# Patient Record
Sex: FEMALE | Race: WHITE | Employment: OTHER | URBAN - NONMETROPOLITAN AREA
[De-identification: names, ages, dates, MRNs, and addresses within clinical notes are randomized per-mention and may not be internally consistent; named-entity substitution may affect disease eponyms.]

---

## 2018-06-25 ENCOUNTER — TRANSFERRED RECORDS (OUTPATIENT)
Dept: HEALTH INFORMATION MANAGEMENT | Facility: OTHER | Age: 83
End: 2018-06-25

## 2018-12-18 ENCOUNTER — OFFICE VISIT (OUTPATIENT)
Dept: FAMILY MEDICINE | Facility: OTHER | Age: 83
End: 2018-12-18
Attending: NURSE PRACTITIONER
Payer: MEDICARE

## 2018-12-18 VITALS — HEART RATE: 74 BPM | WEIGHT: 180.38 LBS | SYSTOLIC BLOOD PRESSURE: 130 MMHG | DIASTOLIC BLOOD PRESSURE: 78 MMHG

## 2018-12-18 DIAGNOSIS — E78.2 MIXED HYPERLIPIDEMIA: ICD-10-CM

## 2018-12-18 DIAGNOSIS — G45.9 TIA (TRANSIENT ISCHEMIC ATTACK): ICD-10-CM

## 2018-12-18 DIAGNOSIS — I10 BENIGN ESSENTIAL HYPERTENSION: ICD-10-CM

## 2018-12-18 DIAGNOSIS — L29.9 PRURITIC DISORDER: ICD-10-CM

## 2018-12-18 DIAGNOSIS — M79.7 FIBROMYALGIA: ICD-10-CM

## 2018-12-18 DIAGNOSIS — G30.1 LATE ONSET ALZHEIMER'S DISEASE WITHOUT BEHAVIORAL DISTURBANCE (H): ICD-10-CM

## 2018-12-18 DIAGNOSIS — E11.8 TYPE 2 DIABETES MELLITUS WITH COMPLICATION, WITHOUT LONG-TERM CURRENT USE OF INSULIN (H): Primary | ICD-10-CM

## 2018-12-18 DIAGNOSIS — F03.90 DEMENTIA WITHOUT BEHAVIORAL DISTURBANCE, UNSPECIFIED DEMENTIA TYPE: ICD-10-CM

## 2018-12-18 DIAGNOSIS — F02.80 LATE ONSET ALZHEIMER'S DISEASE WITHOUT BEHAVIORAL DISTURBANCE (H): ICD-10-CM

## 2018-12-18 DIAGNOSIS — G47.33 OBSTRUCTIVE SLEEP APNEA SYNDROME: ICD-10-CM

## 2018-12-18 LAB
ALBUMIN SERPL-MCNC: 3.7 G/DL (ref 3.5–5.7)
ALP SERPL-CCNC: 96 U/L (ref 34–104)
ALT SERPL W P-5'-P-CCNC: 18 U/L (ref 7–52)
ANION GAP SERPL CALCULATED.3IONS-SCNC: 8 MMOL/L (ref 3–14)
AST SERPL W P-5'-P-CCNC: 23 U/L (ref 13–39)
BILIRUB SERPL-MCNC: 0.6 MG/DL (ref 0.3–1)
BUN SERPL-MCNC: 16 MG/DL (ref 7–25)
CALCIUM SERPL-MCNC: 9.6 MG/DL (ref 8.6–10.3)
CHLORIDE SERPL-SCNC: 100 MMOL/L (ref 98–107)
CO2 SERPL-SCNC: 28 MMOL/L (ref 21–31)
CREAT SERPL-MCNC: 0.73 MG/DL (ref 0.6–1.2)
ERYTHROCYTE [DISTWIDTH] IN BLOOD BY AUTOMATED COUNT: 13.9 % (ref 10–15)
FERRITIN SERPL-MCNC: 248 NG/ML (ref 23.9–336.2)
GFR SERPL CREATININE-BSD FRML MDRD: 76 ML/MIN/1.7M2
GLUCOSE SERPL-MCNC: 214 MG/DL (ref 70–105)
HBA1C MFR BLD: 8.9 % (ref 4–6)
HCT VFR BLD AUTO: 51.6 % (ref 35–47)
HGB BLD-MCNC: 17.5 G/DL (ref 11.7–15.7)
MCH RBC QN AUTO: 32.9 PG (ref 26.5–33)
MCHC RBC AUTO-ENTMCNC: 33.9 G/DL (ref 31.5–36.5)
MCV RBC AUTO: 97 FL (ref 78–100)
PLATELET # BLD AUTO: 162 10E9/L (ref 150–450)
POTASSIUM SERPL-SCNC: 4 MMOL/L (ref 3.5–5.1)
PROT SERPL-MCNC: 7.6 G/DL (ref 6.4–8.9)
RBC # BLD AUTO: 5.32 10E12/L (ref 3.8–5.2)
SODIUM SERPL-SCNC: 136 MMOL/L (ref 134–144)
WBC # BLD AUTO: 9 10E9/L (ref 4–11)

## 2018-12-18 PROCEDURE — 82728 ASSAY OF FERRITIN: CPT | Performed by: NURSE PRACTITIONER

## 2018-12-18 PROCEDURE — G0463 HOSPITAL OUTPT CLINIC VISIT: HCPCS

## 2018-12-18 PROCEDURE — 99203 OFFICE O/P NEW LOW 30 MIN: CPT | Performed by: NURSE PRACTITIONER

## 2018-12-18 PROCEDURE — 36415 COLL VENOUS BLD VENIPUNCTURE: CPT | Performed by: NURSE PRACTITIONER

## 2018-12-18 PROCEDURE — 83036 HEMOGLOBIN GLYCOSYLATED A1C: CPT | Performed by: NURSE PRACTITIONER

## 2018-12-18 PROCEDURE — 80053 COMPREHEN METABOLIC PANEL: CPT | Performed by: NURSE PRACTITIONER

## 2018-12-18 PROCEDURE — 85027 COMPLETE CBC AUTOMATED: CPT | Performed by: NURSE PRACTITIONER

## 2018-12-18 RX ORDER — DILTIAZEM HCL 60 MG
60 TABLET ORAL 4 TIMES DAILY
Qty: 360 TABLET | Refills: 3 | COMMUNITY
Start: 2018-12-18

## 2018-12-18 RX ORDER — HYDROXYZINE HYDROCHLORIDE 25 MG/1
25-50 TABLET, FILM COATED ORAL EVERY 6 HOURS PRN
Qty: 60 TABLET | Refills: 1 | Status: ON HOLD | OUTPATIENT
Start: 2018-12-18 | End: 2019-01-01

## 2018-12-18 RX ORDER — REPAGLINIDE 1 MG/1
1 TABLET ORAL
Qty: 270 TABLET | Refills: 3 | Status: ON HOLD | COMMUNITY
Start: 2018-12-18 | End: 2019-01-01

## 2018-12-18 RX ORDER — HYDROCHLOROTHIAZIDE 25 MG/1
25 TABLET ORAL DAILY
Qty: 90 TABLET | Refills: 3 | COMMUNITY
Start: 2018-12-18

## 2018-12-18 RX ORDER — CITALOPRAM HYDROBROMIDE 40 MG/1
40 TABLET ORAL DAILY
Qty: 90 TABLET | Refills: 3 | COMMUNITY
Start: 2018-12-18

## 2018-12-18 SDOH — HEALTH STABILITY: MENTAL HEALTH: HOW OFTEN DO YOU HAVE A DRINK CONTAINING ALCOHOL?: NEVER

## 2018-12-18 ASSESSMENT — PAIN SCALES - GENERAL: PAINLEVEL: NO PAIN (0)

## 2018-12-18 NOTE — PATIENT INSTRUCTIONS
Hydroxyzine 3 times daily as needed for itching  Good cream to skin 2-3 times daily  Will call with labs and orders when done

## 2018-12-18 NOTE — NURSING NOTE
Patient presents to clinic today to discuss diabetes. She is diabetic and has tried several medication, but is not currently taking anything for diabetes.    Patient unable to complete PHQ     No LMP recorded.  Medication Reconciliation: complete    Ragini Marsh LPN  12/18/2018 2:25 PM

## 2018-12-18 NOTE — PROGRESS NOTES
HPI:    Yumi Souza is a 86 year old female who presents to clinic today for establish care. She is accompanied by her daughters. Was living in [place] and  was providing care for her. She was not able to be on insulin due him not being able to manage this. Due to his failing health, they were moved to [place] to live with a daughter who will be providing care. The daughter is concerned about her DM as her blood sugars are running high. These have been 150-300's, today was 307. Her skin is very itchy. They have used multiple lotions for this. Concerned that her pruritis is related to uncontrolled DM. She is currently on invokana, januvia and rapaglinide. Records that they bring with to have scanned in show last A1C was over 9. She does have dementia, most of her hx if provided by her daughters.    Past Medical History:   Diagnosis Date     CAD (coronary artery disease)     heart attack      Chronic insomnia      Depression      Diverticulitis      DJD (degenerative joint disease)      Fen-phen history      Thyroid nodule, toxic or with hyperthyroidism      TIA (transient ischemic attack)        Past Surgical History:   Procedure Laterality Date     BIOPSY BREAST      benign     C TOTAL KNEE ARTHROPLASTY Right      HERNIA REPAIR, UMBILICAL       LAPAROTOMY EXPLORATORY       SPLENECTOMY         Family History   Problem Relation Age of Onset     Stomach Cancer Sister          at age 77     Bone Cancer Brother          at ae 63     Alzheimer Disease Mother          at age 86     Alzheimer Disease Father          at age 86     Melanoma Daughter          at age 56     Other - See Comments Son          at age 45, plane crash       Social History     Socioeconomic History     Marital status:      Spouse name: Not on file     Number of children: Not on file     Years of education: Not on file     Highest education level: Not on file   Social Needs      Financial resource strain: Not on file     Food insecurity - worry: Not on file     Food insecurity - inability: Not on file     Transportation needs - medical: Not on file     Transportation needs - non-medical: Not on file   Occupational History     Not on file   Tobacco Use     Smoking status: Never Smoker     Smokeless tobacco: Never Used   Substance and Sexual Activity     Alcohol use: No     Frequency: Never     Drug use: No     Sexual activity: Not on file   Other Topics Concern     Not on file   Social History Narrative     and living with daughter who is care giver. Moved from [place]. Has 7 children, 4 living       Current Outpatient Medications   Medication Sig Dispense Refill     canagliflozin (INVOKANA) 300 MG tablet Take 1 tablet (300 mg) by mouth every morning (before breakfast) 90 tablet 3     citalopram (CELEXA) 40 MG tablet Take 1 tablet (40 mg) by mouth daily 90 tablet 3     diltiazem (CARDIZEM) 60 MG tablet Take 1 tablet (60 mg) by mouth 4 times daily 360 tablet 3     hydrochlorothiazide (HYDRODIURIL) 25 MG tablet Take 1 tablet (25 mg) by mouth daily 90 tablet 3     hydrOXYzine (ATARAX) 25 MG tablet Take 1-2 tablets (25-50 mg) by mouth every 6 hours as needed for itching 60 tablet 1     insulin glargine (LANTUS SOLOSTAR PEN) 100 UNIT/ML pen Inject 10 Units Subcutaneous At Bedtime 9 mL 3     repaglinide (PRANDIN) 1 MG tablet Take 1 tablet (1 mg) by mouth 3 times daily (before meals) 270 tablet 3     sitagliptin (JANUVIA) 100 MG tablet Take 1 tablet (100 mg) by mouth daily 90 tablet 3     insulin pen needle (32G X 4 MM) 32G X 4 MM miscellaneous Use 1 pen needle daily or as directed. May dispense stock needle. 100 each 1       Allergies   Allergen Reactions     Aspirin Anaphylaxis     Amoxicillin Hives     Ciprofloxacin Hives     Lisinopril Cough     Sulfa Drugs Hives     Toradol [Ketorolac] Other (See Comments)     Confusion/sedation       ROS:  Pertinent positives and negatives are  noted in HPI.    EXAM:  General appearance: well appearing elderly female. In wheelchair,  in no acute distress  Head: normocephalic, atraumatic  Ears: TM's with cone of light, no erythema, canals clear bilaterally  Eyes: conjunctivae normal  Orophayrnx: moist mucous membranes, tonsils without erythema, exudates or petechiae, no post nasal drip seen  Neck: supple without adenopathy  Respiratory: clear to auscultation bilaterally  Cardiac: RRR with no murmurs  Abdomen: soft, nontender, no masses or organomegally  Musculoskeletal: sitting in wheel chair  Dermatological: no rashes or lesions, scratching skin a lot  Psychological: normal affect, alert and pleasant  Lab:   Results for orders placed or performed in visit on 12/18/18   Hemoglobin A1c   Result Value Ref Range    Hemoglobin A1C 8.9 (H) 4.0 - 6.0 %   Comprehensive Metabolic Panel   Result Value Ref Range    Sodium 136 134 - 144 mmol/L    Potassium 4.0 3.5 - 5.1 mmol/L    Chloride 100 98 - 107 mmol/L    Carbon Dioxide 28 21 - 31 mmol/L    Anion Gap 8 3 - 14 mmol/L    Glucose 214 (H) 70 - 105 mg/dL    Urea Nitrogen 16 7 - 25 mg/dL    Creatinine 0.73 0.60 - 1.20 mg/dL    GFR Estimate 76 >60 mL/min/1.7m2    GFR Estimate If Black >90 >60 mL/min/1.7m2    Calcium 9.6 8.6 - 10.3 mg/dL    Bilirubin Total 0.6 0.3 - 1.0 mg/dL    Albumin 3.7 3.5 - 5.7 g/dL    Protein Total 7.6 6.4 - 8.9 g/dL    Alkaline Phosphatase 96 34 - 104 U/L    ALT 18 7 - 52 U/L    AST 23 13 - 39 U/L   CBC W PLT No Diff   Result Value Ref Range    WBC 9.0 4.0 - 11.0 10e9/L    RBC Count 5.32 (H) 3.8 - 5.2 10e12/L    Hemoglobin 17.5 (H) 11.7 - 15.7 g/dL    Hematocrit 51.6 (H) 35.0 - 47.0 %    MCV 97 78 - 100 fl    MCH 32.9 26.5 - 33.0 pg    MCHC 33.9 31.5 - 36.5 g/dL    RDW 13.9 10.0 - 15.0 %    Platelet Count 162 150 - 450 10e9/L   Ferritin   Result Value Ref Range    Ferritin 248 23.9 - 336.2 ng/mL       PHQ Depression Screen  No flowsheet data found.    ASSESSMENT AND PLAN:    1. Type 2 diabetes  mellitus with complication, without long-term current use of insulin (H)    2. Dementia without behavioral disturbance, unspecified dementia type    3. Benign essential hypertension    4. Pruritic disorder    5. Obstructive sleep apnea syndrome    6. Fibromyalgia    7. Mixed hyperlipidemia    8. TIA (transient ischemic attack)    9. Late onset Alzheimer's disease without behavioral disturbance      Labs are overall stable. Reviewed records from [place] with them and updated history. Plan to start her on insulin with recheck of DM in 3 months. Started on hydroxyzine for her itching which will likely help with some of her anxiety.  Discussed skin hygiene and hydration.       Reba Mcgarry..................12/18/2018 2:22 PM

## 2018-12-19 DIAGNOSIS — Z79.4 DIABETES MELLITUS WITH INSULIN THERAPY (H): ICD-10-CM

## 2018-12-19 DIAGNOSIS — E11.8 TYPE 2 DIABETES MELLITUS WITH COMPLICATION (H): Primary | ICD-10-CM

## 2018-12-19 DIAGNOSIS — E11.9 DIABETES MELLITUS WITH INSULIN THERAPY (H): ICD-10-CM

## 2018-12-19 NOTE — TELEPHONE ENCOUNTER
Patient was prescribed lantus yesterday, however needles were not sent. Message will be sent to another provider so patient can start insulin today.     Ragini Marsh LPN...................12/19/2018  3:44 PM

## 2018-12-20 PROBLEM — F02.80 LATE ONSET ALZHEIMER'S DISEASE WITHOUT BEHAVIORAL DISTURBANCE (H): Status: ACTIVE | Noted: 2018-12-20

## 2018-12-20 PROBLEM — G47.33 OBSTRUCTIVE SLEEP APNEA SYNDROME: Status: ACTIVE | Noted: 2018-12-20

## 2018-12-20 PROBLEM — G45.9 TIA (TRANSIENT ISCHEMIC ATTACK): Status: ACTIVE | Noted: 2018-12-20

## 2018-12-20 PROBLEM — I10 BENIGN ESSENTIAL HYPERTENSION: Status: ACTIVE | Noted: 2018-12-20

## 2018-12-20 PROBLEM — E78.2 MIXED HYPERLIPIDEMIA: Status: ACTIVE | Noted: 2018-12-20

## 2018-12-20 PROBLEM — M79.7 FIBROMYALGIA: Status: ACTIVE | Noted: 2018-12-20

## 2018-12-20 PROBLEM — G30.1 LATE ONSET ALZHEIMER'S DISEASE WITHOUT BEHAVIORAL DISTURBANCE (H): Status: ACTIVE | Noted: 2018-12-20

## 2018-12-21 ENCOUNTER — TELEPHONE (OUTPATIENT)
Dept: FAMILY MEDICINE | Facility: OTHER | Age: 83
End: 2018-12-21

## 2018-12-21 NOTE — TELEPHONE ENCOUNTER
Spoke with daughter, they are wondering since starting insulin if she still has to be on the other diabetic meds.  Blood sugars have been around 200.    Ginger Gambino LPN ...... 12/21/2018 10:58 AM

## 2018-12-21 NOTE — TELEPHONE ENCOUNTER
Patients daughter notified.  States she got really bad diarrhea from the metformin. They will call early January and keep a log with blood sugars.  Ginger Gambino LPN ...... 12/21/2018 11:05 AM

## 2018-12-21 NOTE — TELEPHONE ENCOUNTER
Yes she should remain on all other medications. We may consider adding metformin as well as this can help the lantus work better also. Please let me know how her blood sugars are doing the first week of January. DAYNA Corona CNP on 12/21/2018 at 11:02 AM

## 2018-12-28 ENCOUNTER — TELEPHONE (OUTPATIENT)
Dept: FAMILY MEDICINE | Facility: OTHER | Age: 83
End: 2018-12-28

## 2018-12-28 NOTE — TELEPHONE ENCOUNTER
Patients pen needles are at [facility] and ready to be picked up. Patients EC notified.    Charito Cifuentes LPN on 12/28/2018 at 3:08 PM

## 2018-12-28 NOTE — TELEPHONE ENCOUNTER
Pt needs an Rx for insulin supplies. States that they received insulin but they never received any needles or anything.

## 2019-01-01 ENCOUNTER — TELEPHONE (OUTPATIENT)
Dept: FAMILY MEDICINE | Facility: OTHER | Age: 84
End: 2019-01-01

## 2019-01-01 ENCOUNTER — NURSING HOME DICTATION (OUTPATIENT)
Dept: INTERNAL MEDICINE | Facility: OTHER | Age: 84
End: 2019-01-01

## 2019-01-01 ENCOUNTER — MEDICAL CORRESPONDENCE (OUTPATIENT)
Dept: HEALTH INFORMATION MANAGEMENT | Facility: OTHER | Age: 84
End: 2019-01-01

## 2019-01-01 ENCOUNTER — NURSING HOME VISIT (OUTPATIENT)
Dept: GERIATRICS | Facility: OTHER | Age: 84
End: 2019-01-01
Attending: NURSE PRACTITIONER
Payer: MEDICARE

## 2019-01-01 ENCOUNTER — NURSING HOME VISIT (OUTPATIENT)
Dept: GERIATRICS | Facility: OTHER | Age: 84
End: 2019-01-01
Attending: FAMILY MEDICINE
Payer: MEDICARE

## 2019-01-01 ENCOUNTER — HOSPITAL ENCOUNTER (OUTPATIENT)
Facility: OTHER | Age: 84
Setting detail: OBSERVATION
Discharge: NURSING, REHAB OR LONG-TERM CARE | End: 2019-03-22
Attending: PHYSICIAN ASSISTANT | Admitting: INTERNAL MEDICINE
Payer: MEDICARE

## 2019-01-01 ENCOUNTER — APPOINTMENT (OUTPATIENT)
Dept: GENERAL RADIOLOGY | Facility: OTHER | Age: 84
End: 2019-01-01
Attending: PHYSICIAN ASSISTANT
Payer: MEDICARE

## 2019-01-01 ENCOUNTER — RESULTS ONLY (OUTPATIENT)
Dept: LAB | Age: 84
End: 2019-01-01

## 2019-01-01 ENCOUNTER — OFFICE VISIT (OUTPATIENT)
Dept: FAMILY MEDICINE | Facility: OTHER | Age: 84
End: 2019-01-01
Attending: FAMILY MEDICINE
Payer: MEDICARE

## 2019-01-01 ENCOUNTER — TRANSCRIBE ORDERS (OUTPATIENT)
Dept: FAMILY MEDICINE | Facility: OTHER | Age: 84
End: 2019-01-01

## 2019-01-01 ENCOUNTER — HOSPITAL ENCOUNTER (EMERGENCY)
Facility: OTHER | Age: 84
Discharge: NURSING, REHAB OR LONG-TERM CARE | End: 2019-05-12
Attending: FAMILY MEDICINE | Admitting: FAMILY MEDICINE
Payer: MEDICARE

## 2019-01-01 ENCOUNTER — APPOINTMENT (OUTPATIENT)
Dept: PHYSICAL THERAPY | Facility: OTHER | Age: 84
End: 2019-01-01
Attending: PHYSICIAN ASSISTANT
Payer: MEDICARE

## 2019-01-01 VITALS
WEIGHT: 180 LBS | OXYGEN SATURATION: 90 % | HEART RATE: 70 BPM | SYSTOLIC BLOOD PRESSURE: 98 MMHG | HEIGHT: 64 IN | DIASTOLIC BLOOD PRESSURE: 56 MMHG | TEMPERATURE: 96.5 F | RESPIRATION RATE: 20 BRPM | BODY MASS INDEX: 30.73 KG/M2

## 2019-01-01 VITALS
WEIGHT: 180 LBS | SYSTOLIC BLOOD PRESSURE: 134 MMHG | HEART RATE: 85 BPM | OXYGEN SATURATION: 99 % | RESPIRATION RATE: 18 BRPM | DIASTOLIC BLOOD PRESSURE: 78 MMHG | TEMPERATURE: 99.3 F | BODY MASS INDEX: 30.9 KG/M2

## 2019-01-01 VITALS
SYSTOLIC BLOOD PRESSURE: 122 MMHG | TEMPERATURE: 98.6 F | OXYGEN SATURATION: 97 % | HEART RATE: 62 BPM | RESPIRATION RATE: 14 BRPM | DIASTOLIC BLOOD PRESSURE: 62 MMHG

## 2019-01-01 DIAGNOSIS — G30.1 LATE ONSET ALZHEIMER'S DISEASE WITHOUT BEHAVIORAL DISTURBANCE (H): Primary | ICD-10-CM

## 2019-01-01 DIAGNOSIS — E11.65 TYPE 2 DIABETES MELLITUS WITH HYPERGLYCEMIA, WITHOUT LONG-TERM CURRENT USE OF INSULIN (H): ICD-10-CM

## 2019-01-01 DIAGNOSIS — M62.81 GENERALIZED MUSCLE WEAKNESS: ICD-10-CM

## 2019-01-01 DIAGNOSIS — F03.90 DEMENTIA (H): ICD-10-CM

## 2019-01-01 DIAGNOSIS — G30.1 LATE ONSET ALZHEIMER'S DISEASE WITHOUT BEHAVIORAL DISTURBANCE (H): ICD-10-CM

## 2019-01-01 DIAGNOSIS — E11.65 TYPE 2 DIABETES MELLITUS WITH HYPERGLYCEMIA, WITHOUT LONG-TERM CURRENT USE OF INSULIN (H): Primary | ICD-10-CM

## 2019-01-01 DIAGNOSIS — F02.80 LATE ONSET ALZHEIMER'S DISEASE WITHOUT BEHAVIORAL DISTURBANCE (H): ICD-10-CM

## 2019-01-01 DIAGNOSIS — Z86.73 TRANSIENT ISCHEMIC ATTACK (TIA), AND CEREBRAL INFARCTION WITHOUT RESIDUAL DEFICITS(V12.54): ICD-10-CM

## 2019-01-01 DIAGNOSIS — B37.31 YEAST INFECTION OF THE VAGINA: Primary | ICD-10-CM

## 2019-01-01 DIAGNOSIS — F02.80 DEMENTIA OF THE ALZHEIMER'S TYPE, WITH LATE ONSET, WITH DELIRIUM (H): ICD-10-CM

## 2019-01-01 DIAGNOSIS — Z91.81 RISK FOR FALLS: ICD-10-CM

## 2019-01-01 DIAGNOSIS — F02.80 LATE ONSET ALZHEIMER'S DISEASE WITHOUT BEHAVIORAL DISTURBANCE (H): Primary | ICD-10-CM

## 2019-01-01 DIAGNOSIS — F05 DEMENTIA OF THE ALZHEIMER'S TYPE, WITH LATE ONSET, WITH DELIRIUM (H): ICD-10-CM

## 2019-01-01 DIAGNOSIS — G30.1 DEMENTIA OF THE ALZHEIMER'S TYPE, WITH LATE ONSET, WITH DELIRIUM (H): ICD-10-CM

## 2019-01-01 DIAGNOSIS — Z91.81 PERSONAL HISTORY OF FALL: ICD-10-CM

## 2019-01-01 DIAGNOSIS — B37.31 CANDIDIASIS OF VAGINA: ICD-10-CM

## 2019-01-01 DIAGNOSIS — I10 BENIGN ESSENTIAL HYPERTENSION: ICD-10-CM

## 2019-01-01 DIAGNOSIS — E11.9 TYPE 2 DIABETES MELLITUS, WITHOUT LONG-TERM CURRENT USE OF INSULIN (H): ICD-10-CM

## 2019-01-01 DIAGNOSIS — I10 ESSENTIAL HYPERTENSION, MALIGNANT: ICD-10-CM

## 2019-01-01 DIAGNOSIS — F03.91 DEMENTIA WITH BEHAVIORAL DISTURBANCE, UNSPECIFIED DEMENTIA TYPE: ICD-10-CM

## 2019-01-01 DIAGNOSIS — E11.9 TYPE 2 DIABETES MELLITUS WITHOUT COMPLICATION, WITHOUT LONG-TERM CURRENT USE OF INSULIN (H): ICD-10-CM

## 2019-01-01 DIAGNOSIS — B37.31 CANDIDIASIS OF VULVA AND VAGINA: ICD-10-CM

## 2019-01-01 LAB
ALBUMIN SERPL-MCNC: 3.4 G/DL (ref 3.5–5.7)
ALBUMIN SERPL-MCNC: 3.7 G/DL (ref 3.5–5.7)
ALBUMIN UR-MCNC: NEGATIVE MG/DL
ALBUMIN UR-MCNC: NEGATIVE MG/DL
ALP SERPL-CCNC: 79 U/L (ref 34–104)
ALP SERPL-CCNC: 93 U/L (ref 34–104)
ALT SERPL W P-5'-P-CCNC: 15 U/L (ref 7–52)
ALT SERPL W P-5'-P-CCNC: 9 U/L (ref 7–52)
AMORPH CRY #/AREA URNS HPF: ABNORMAL /HPF
ANION GAP SERPL CALCULATED.3IONS-SCNC: 7 MMOL/L (ref 3–14)
ANION GAP SERPL CALCULATED.3IONS-SCNC: 8 MMOL/L (ref 3–14)
APPEARANCE UR: CLEAR
APPEARANCE UR: CLEAR
AST SERPL W P-5'-P-CCNC: 13 U/L (ref 13–39)
AST SERPL W P-5'-P-CCNC: 20 U/L (ref 13–39)
BACTERIA #/AREA URNS HPF: ABNORMAL /HPF
BASOPHILS # BLD AUTO: 0 10E9/L (ref 0–0.2)
BASOPHILS # BLD AUTO: 0 10E9/L (ref 0–0.2)
BASOPHILS NFR BLD AUTO: 0.3 %
BASOPHILS NFR BLD AUTO: 0.4 %
BILIRUB SERPL-MCNC: 0.3 MG/DL (ref 0.3–1)
BILIRUB SERPL-MCNC: 0.5 MG/DL (ref 0.3–1)
BILIRUB UR QL STRIP: NEGATIVE
BILIRUB UR QL STRIP: NEGATIVE
BUN SERPL-MCNC: 13 MG/DL (ref 7–25)
BUN SERPL-MCNC: 16 MG/DL (ref 7–25)
CALCIUM SERPL-MCNC: 9.1 MG/DL (ref 8.6–10.3)
CALCIUM SERPL-MCNC: 9.6 MG/DL (ref 8.6–10.3)
CHLORIDE SERPL-SCNC: 96 MMOL/L (ref 98–107)
CHLORIDE SERPL-SCNC: 98 MMOL/L (ref 98–107)
CO2 SERPL-SCNC: 28 MMOL/L (ref 21–31)
CO2 SERPL-SCNC: 30 MMOL/L (ref 21–31)
COLOR UR AUTO: YELLOW
COLOR UR AUTO: YELLOW
CREAT SERPL-MCNC: 0.65 MG/DL (ref 0.6–1.2)
CREAT SERPL-MCNC: 0.8 MG/DL (ref 0.6–1.2)
DIFFERENTIAL METHOD BLD: ABNORMAL
DIFFERENTIAL METHOD BLD: NORMAL
EOSINOPHIL # BLD AUTO: 0.2 10E9/L (ref 0–0.7)
EOSINOPHIL # BLD AUTO: 0.2 10E9/L (ref 0–0.7)
EOSINOPHIL NFR BLD AUTO: 1.5 %
EOSINOPHIL NFR BLD AUTO: 2.2 %
ERYTHROCYTE [DISTWIDTH] IN BLOOD BY AUTOMATED COUNT: 13.2 % (ref 10–15)
ERYTHROCYTE [DISTWIDTH] IN BLOOD BY AUTOMATED COUNT: 13.8 % (ref 10–15)
GFR SERPL CREATININE-BSD FRML MDRD: 68 ML/MIN/{1.73_M2}
GFR SERPL CREATININE-BSD FRML MDRD: 86 ML/MIN/{1.73_M2}
GLUCOSE SERPL-MCNC: 102 MG/DL (ref 70–105)
GLUCOSE SERPL-MCNC: 121 MG/DL (ref 70–105)
GLUCOSE UR STRIP-MCNC: >1000 MG/DL
GLUCOSE UR STRIP-MCNC: NEGATIVE MG/DL
HBA1C MFR BLD: 8.5 % (ref 4–6)
HCT VFR BLD AUTO: 45.9 % (ref 35–47)
HCT VFR BLD AUTO: 52.3 % (ref 35–47)
HGB BLD-MCNC: 15.5 G/DL (ref 11.7–15.7)
HGB BLD-MCNC: 17.3 G/DL (ref 11.7–15.7)
HGB UR QL STRIP: ABNORMAL
HGB UR QL STRIP: NEGATIVE
IMM GRANULOCYTES # BLD: 0 10E9/L (ref 0–0.4)
IMM GRANULOCYTES # BLD: 0 10E9/L (ref 0–0.4)
IMM GRANULOCYTES NFR BLD: 0.4 %
IMM GRANULOCYTES NFR BLD: 0.4 %
KETONES UR STRIP-MCNC: ABNORMAL MG/DL
KETONES UR STRIP-MCNC: NEGATIVE MG/DL
LACTATE SERPL-SCNC: 1.1 MMOL/L (ref 0.5–2.2)
LEUKOCYTE ESTERASE UR QL STRIP: NEGATIVE
LEUKOCYTE ESTERASE UR QL STRIP: NEGATIVE
LYMPHOCYTES # BLD AUTO: 3.5 10E9/L (ref 0.8–5.3)
LYMPHOCYTES # BLD AUTO: 3.7 10E9/L (ref 0.8–5.3)
LYMPHOCYTES NFR BLD AUTO: 35.6 %
LYMPHOCYTES NFR BLD AUTO: 38.2 %
MCH RBC QN AUTO: 32.4 PG (ref 26.5–33)
MCH RBC QN AUTO: 32.6 PG (ref 26.5–33)
MCHC RBC AUTO-ENTMCNC: 33.1 G/DL (ref 31.5–36.5)
MCHC RBC AUTO-ENTMCNC: 33.8 G/DL (ref 31.5–36.5)
MCV RBC AUTO: 96 FL (ref 78–100)
MCV RBC AUTO: 99 FL (ref 78–100)
MONOCYTES # BLD AUTO: 0.6 10E9/L (ref 0–1.3)
MONOCYTES # BLD AUTO: 0.7 10E9/L (ref 0–1.3)
MONOCYTES NFR BLD AUTO: 6 %
MONOCYTES NFR BLD AUTO: 7.5 %
NEUTROPHILS # BLD AUTO: 5.1 10E9/L (ref 1.6–8.3)
NEUTROPHILS # BLD AUTO: 5.4 10E9/L (ref 1.6–8.3)
NEUTROPHILS NFR BLD AUTO: 52.1 %
NEUTROPHILS NFR BLD AUTO: 55.4 %
NITRATE UR QL: NEGATIVE
NITRATE UR QL: NEGATIVE
NON-SQ EPI CELLS #/AREA URNS LPF: ABNORMAL /LPF
PH UR STRIP: 5.5 PH (ref 5–9)
PH UR STRIP: 6 PH (ref 5–9)
PLATELET # BLD AUTO: 214 10E9/L (ref 150–450)
PLATELET # BLD AUTO: 218 10E9/L (ref 150–450)
POTASSIUM SERPL-SCNC: 3.6 MMOL/L (ref 3.5–5.1)
POTASSIUM SERPL-SCNC: 3.7 MMOL/L (ref 3.5–5.1)
PROT SERPL-MCNC: 7.2 G/DL (ref 6.4–8.9)
PROT SERPL-MCNC: 7.9 G/DL (ref 6.4–8.9)
RBC # BLD AUTO: 4.78 10E12/L (ref 3.8–5.2)
RBC # BLD AUTO: 5.31 10E12/L (ref 3.8–5.2)
RBC #/AREA URNS AUTO: ABNORMAL /HPF
SODIUM SERPL-SCNC: 133 MMOL/L (ref 134–144)
SODIUM SERPL-SCNC: 134 MMOL/L (ref 134–144)
SOURCE: ABNORMAL
SOURCE: NORMAL
SP GR UR STRIP: 1.01 (ref 1–1.03)
SP GR UR STRIP: 1.02 (ref 1–1.03)
TROPONIN I SERPL-MCNC: <0.03 UG/L (ref 0–0.03)
UROBILINOGEN UR STRIP-ACNC: 0.2 EU/DL (ref 0.2–1)
UROBILINOGEN UR STRIP-ACNC: 0.2 EU/DL (ref 0.2–1)
WBC # BLD AUTO: 9.7 10E9/L (ref 4–11)
WBC # BLD AUTO: 9.7 10E9/L (ref 4–11)
WBC #/AREA URNS AUTO: ABNORMAL /HPF

## 2019-01-01 PROCEDURE — 85025 COMPLETE CBC W/AUTO DIFF WBC: CPT | Performed by: PHYSICIAN ASSISTANT

## 2019-01-01 PROCEDURE — 99285 EMERGENCY DEPT VISIT HI MDM: CPT | Mod: 25 | Performed by: PHYSICIAN ASSISTANT

## 2019-01-01 PROCEDURE — 99285 EMERGENCY DEPT VISIT HI MDM: CPT | Mod: Z6 | Performed by: PHYSICIAN ASSISTANT

## 2019-01-01 PROCEDURE — A9270 NON-COVERED ITEM OR SERVICE: HCPCS | Mod: GY | Performed by: PHYSICIAN ASSISTANT

## 2019-01-01 PROCEDURE — 80053 COMPREHEN METABOLIC PANEL: CPT | Performed by: FAMILY MEDICINE

## 2019-01-01 PROCEDURE — 36415 COLL VENOUS BLD VENIPUNCTURE: CPT | Performed by: FAMILY MEDICINE

## 2019-01-01 PROCEDURE — 99214 OFFICE O/P EST MOD 30 MIN: CPT | Performed by: FAMILY MEDICINE

## 2019-01-01 PROCEDURE — 99307 SBSQ NF CARE SF MDM 10: CPT | Performed by: NURSE PRACTITIONER

## 2019-01-01 PROCEDURE — 84484 ASSAY OF TROPONIN QUANT: CPT | Performed by: PHYSICIAN ASSISTANT

## 2019-01-01 PROCEDURE — G0378 HOSPITAL OBSERVATION PER HR: HCPCS

## 2019-01-01 PROCEDURE — 83605 ASSAY OF LACTIC ACID: CPT | Performed by: PHYSICIAN ASSISTANT

## 2019-01-01 PROCEDURE — 97165 OT EVAL LOW COMPLEX 30 MIN: CPT | Mod: GO | Performed by: OCCUPATIONAL THERAPIST

## 2019-01-01 PROCEDURE — 85025 COMPLETE CBC W/AUTO DIFF WBC: CPT | Performed by: FAMILY MEDICINE

## 2019-01-01 PROCEDURE — 81003 URINALYSIS AUTO W/O SCOPE: CPT | Performed by: FAMILY MEDICINE

## 2019-01-01 PROCEDURE — 93005 ELECTROCARDIOGRAM TRACING: CPT | Performed by: PHYSICIAN ASSISTANT

## 2019-01-01 PROCEDURE — 96372 THER/PROPH/DIAG INJ SC/IM: CPT

## 2019-01-01 PROCEDURE — 25000132 ZZH RX MED GY IP 250 OP 250 PS 637: Mod: GY | Performed by: PHYSICIAN ASSISTANT

## 2019-01-01 PROCEDURE — 97535 SELF CARE MNGMENT TRAINING: CPT | Mod: GO | Performed by: OCCUPATIONAL THERAPIST

## 2019-01-01 PROCEDURE — 25000131 ZZH RX MED GY IP 250 OP 636 PS 637: Mod: GY | Performed by: PHYSICIAN ASSISTANT

## 2019-01-01 PROCEDURE — 97116 GAIT TRAINING THERAPY: CPT | Mod: GP,XU

## 2019-01-01 PROCEDURE — 97530 THERAPEUTIC ACTIVITIES: CPT | Mod: GP

## 2019-01-01 PROCEDURE — 99283 EMERGENCY DEPT VISIT LOW MDM: CPT | Performed by: FAMILY MEDICINE

## 2019-01-01 PROCEDURE — 97161 PT EVAL LOW COMPLEX 20 MIN: CPT | Mod: GP

## 2019-01-01 PROCEDURE — 99235 HOSP IP/OBS SAME DATE MOD 70: CPT | Performed by: INTERNAL MEDICINE

## 2019-01-01 PROCEDURE — 99309 SBSQ NF CARE MODERATE MDM 30: CPT | Performed by: NURSE PRACTITIONER

## 2019-01-01 PROCEDURE — 80053 COMPREHEN METABOLIC PANEL: CPT | Performed by: PHYSICIAN ASSISTANT

## 2019-01-01 PROCEDURE — 71046 X-RAY EXAM CHEST 2 VIEWS: CPT

## 2019-01-01 PROCEDURE — 93010 ELECTROCARDIOGRAM REPORT: CPT | Performed by: INTERNAL MEDICINE

## 2019-01-01 PROCEDURE — G0463 HOSPITAL OUTPT CLINIC VISIT: HCPCS

## 2019-01-01 PROCEDURE — 81001 URINALYSIS AUTO W/SCOPE: CPT | Performed by: PHYSICIAN ASSISTANT

## 2019-01-01 PROCEDURE — 99308 SBSQ NF CARE LOW MDM 20: CPT | Performed by: FAMILY MEDICINE

## 2019-01-01 PROCEDURE — 99283 EMERGENCY DEPT VISIT LOW MDM: CPT | Mod: Z6 | Performed by: FAMILY MEDICINE

## 2019-01-01 PROCEDURE — 36415 COLL VENOUS BLD VENIPUNCTURE: CPT | Performed by: PHYSICIAN ASSISTANT

## 2019-01-01 RX ORDER — ACETAMINOPHEN 325 MG/1
650 TABLET ORAL EVERY 4 HOURS PRN
Status: DISCONTINUED | OUTPATIENT
Start: 2019-01-01 | End: 2019-01-01 | Stop reason: HOSPADM

## 2019-01-01 RX ORDER — METFORMIN HCL 500 MG
1000 TABLET, EXTENDED RELEASE 24 HR ORAL 2 TIMES DAILY WITH MEALS
Refills: 0 | COMMUNITY
Start: 2019-01-01

## 2019-01-01 RX ORDER — REPAGLINIDE 1 MG/1
1 TABLET ORAL
COMMUNITY

## 2019-01-01 RX ORDER — LORAZEPAM 0.5 MG/1
0.5 TABLET ORAL EVERY 4 HOURS PRN
Status: DISCONTINUED | OUTPATIENT
Start: 2019-01-01 | End: 2019-01-01 | Stop reason: HOSPADM

## 2019-01-01 RX ORDER — FLUCONAZOLE 150 MG/1
150 TABLET ORAL DAILY
Status: DISCONTINUED | OUTPATIENT
Start: 2019-01-01 | End: 2019-01-01 | Stop reason: HOSPADM

## 2019-01-01 RX ORDER — LORAZEPAM 0.5 MG/1
0.5 TABLET ORAL EVERY 6 HOURS PRN
Refills: 0 | COMMUNITY
Start: 2019-01-01

## 2019-01-01 RX ORDER — INSULIN GLARGINE 100 [IU]/ML
10 INJECTION, SOLUTION SUBCUTANEOUS AT BEDTIME
Status: DISCONTINUED | OUTPATIENT
Start: 2019-01-01 | End: 2019-01-01 | Stop reason: HOSPADM

## 2019-01-01 RX ORDER — LORAZEPAM 2 MG/ML
0.5 INJECTION INTRAMUSCULAR EVERY 4 HOURS PRN
Status: DISCONTINUED | OUTPATIENT
Start: 2019-01-01 | End: 2019-01-01 | Stop reason: HOSPADM

## 2019-01-01 RX ORDER — FLUCONAZOLE 150 MG/1
150 TABLET ORAL ONCE
Qty: 1 TABLET | Refills: 0 | Status: ON HOLD | OUTPATIENT
Start: 2019-01-01 | End: 2019-01-01

## 2019-01-01 RX ORDER — ONDANSETRON 4 MG/1
4 TABLET, ORALLY DISINTEGRATING ORAL EVERY 6 HOURS PRN
Status: DISCONTINUED | OUTPATIENT
Start: 2019-01-01 | End: 2019-01-01 | Stop reason: HOSPADM

## 2019-01-01 RX ORDER — ACETAMINOPHEN 325 MG/1
325-650 TABLET ORAL EVERY 6 HOURS PRN
COMMUNITY

## 2019-01-01 RX ORDER — DEXTROSE MONOHYDRATE 25 G/50ML
25-50 INJECTION, SOLUTION INTRAVENOUS
Status: DISCONTINUED | OUTPATIENT
Start: 2019-01-01 | End: 2019-01-01 | Stop reason: HOSPADM

## 2019-01-01 RX ORDER — ONDANSETRON 2 MG/ML
4 INJECTION INTRAMUSCULAR; INTRAVENOUS EVERY 6 HOURS PRN
Status: DISCONTINUED | OUTPATIENT
Start: 2019-01-01 | End: 2019-01-01 | Stop reason: HOSPADM

## 2019-01-01 RX ORDER — NICOTINE POLACRILEX 4 MG
15-30 LOZENGE BUCCAL
Status: DISCONTINUED | OUTPATIENT
Start: 2019-01-01 | End: 2019-01-01 | Stop reason: HOSPADM

## 2019-01-01 RX ADMIN — INSULIN GLARGINE 10 UNITS: 100 INJECTION, SOLUTION SUBCUTANEOUS at 23:16

## 2019-01-01 RX ADMIN — ACETAMINOPHEN 650 MG: 325 TABLET, FILM COATED ORAL at 06:51

## 2019-01-01 RX ADMIN — LORAZEPAM 0.5 MG: 0.5 TABLET ORAL at 06:51

## 2019-01-01 RX ADMIN — FLUCONAZOLE 150 MG: 150 TABLET ORAL at 19:03

## 2019-01-01 RX ADMIN — LORAZEPAM 0.5 MG: 0.5 TABLET ORAL at 23:21

## 2019-01-01 RX ADMIN — ACETAMINOPHEN 650 MG: 325 TABLET, FILM COATED ORAL at 23:21

## 2019-01-01 ASSESSMENT — ANXIETY QUESTIONNAIRES
5. BEING SO RESTLESS THAT IT IS HARD TO SIT STILL: NOT AT ALL
GAD7 TOTAL SCORE: 0
2. NOT BEING ABLE TO STOP OR CONTROL WORRYING: NOT AT ALL
7. FEELING AFRAID AS IF SOMETHING AWFUL MIGHT HAPPEN: NOT AT ALL
3. WORRYING TOO MUCH ABOUT DIFFERENT THINGS: NOT AT ALL
GAD7 TOTAL SCORE: 0
6. BECOMING EASILY ANNOYED OR IRRITABLE: NOT AT ALL
1. FEELING NERVOUS, ANXIOUS, OR ON EDGE: NOT AT ALL
IF YOU CHECKED OFF ANY PROBLEMS ON THIS QUESTIONNAIRE, HOW DIFFICULT HAVE THESE PROBLEMS MADE IT FOR YOU TO DO YOUR WORK, TAKE CARE OF THINGS AT HOME, OR GET ALONG WITH OTHER PEOPLE: NOT DIFFICULT AT ALL

## 2019-01-01 ASSESSMENT — ENCOUNTER SYMPTOMS
DIARRHEA: 0
WOUND: 0
NAUSEA: 0
LIGHT-HEADEDNESS: 0
DIZZINESS: 0
CHILLS: 0
CONFUSION: 1
VOMITING: 0
BRUISES/BLEEDS EASILY: 0
FATIGUE: 1
HALLUCINATIONS: 1
APPETITE CHANGE: 0
WEAKNESS: 1
ADENOPATHY: 0
CHEST TIGHTNESS: 0
CONSTIPATION: 0
HEMATURIA: 0
FREQUENCY: 1
ACTIVITY CHANGE: 1
FEVER: 0
SHORTNESS OF BREATH: 0
ABDOMINAL PAIN: 0
BACK PAIN: 0
COUGH: 1

## 2019-01-01 ASSESSMENT — COLUMBIA-SUICIDE SEVERITY RATING SCALE - C-SSRS
1. IN THE PAST MONTH, HAVE YOU WISHED YOU WERE DEAD OR WISHED YOU COULD GO TO SLEEP AND NOT WAKE UP?: NO
2. HAVE YOU ACTUALLY HAD ANY THOUGHTS OF KILLING YOURSELF IN THE PAST MONTH?: NO

## 2019-01-01 ASSESSMENT — ACTIVITIES OF DAILY LIVING (ADL)
DRESS: 1-->ASSISTIVE EQUIPMENT
RETIRED_EATING: 1-->ASSISTIVE EQUIPMENT
RETIRED_COMMUNICATION: 2-->DIFFICULTY UNDERSTANDING (NOT RELATED TO LANGUAGE BARRIER)
TOILETING: 1-->ASSISTIVE EQUIPMENT
SWALLOWING: 0-->SWALLOWS FOODS/LIQUIDS WITHOUT DIFFICULTY
BATHING: 1-->ASSISTIVE EQUIPMENT

## 2019-01-01 ASSESSMENT — PAIN SCALES - GENERAL: PAINLEVEL: NO PAIN (0)

## 2019-01-01 ASSESSMENT — PATIENT HEALTH QUESTIONNAIRE - PHQ9: 5. POOR APPETITE OR OVEREATING: NOT AT ALL

## 2019-01-01 ASSESSMENT — MIFFLIN-ST. JEOR: SCORE: 1241.47

## 2019-01-10 ENCOUNTER — TELEPHONE (OUTPATIENT)
Dept: FAMILY MEDICINE | Facility: OTHER | Age: 84
End: 2019-01-10

## 2019-01-10 DIAGNOSIS — B37.31 YEAST INFECTION OF THE VAGINA: Primary | ICD-10-CM

## 2019-01-10 RX ORDER — FLUCONAZOLE 150 MG/1
150 TABLET ORAL ONCE
Qty: 1 TABLET | Refills: 0 | Status: ON HOLD | OUTPATIENT
Start: 2019-01-10 | End: 2019-01-01

## 2019-01-10 NOTE — TELEPHONE ENCOUNTER
Daughter asked for diflucan as she has vaginal yeast infection sx and wont use the OTC cream. Rx sent. DAYNA Corona CNP on 1/10/2019 at 3:35 PM

## 2019-01-23 ENCOUNTER — OFFICE VISIT (OUTPATIENT)
Dept: FAMILY MEDICINE | Facility: OTHER | Age: 84
End: 2019-01-23
Attending: NURSE PRACTITIONER
Payer: MEDICARE

## 2019-01-23 VITALS
SYSTOLIC BLOOD PRESSURE: 136 MMHG | DIASTOLIC BLOOD PRESSURE: 78 MMHG | OXYGEN SATURATION: 93 % | TEMPERATURE: 98.9 F | HEART RATE: 82 BPM | RESPIRATION RATE: 18 BRPM

## 2019-01-23 DIAGNOSIS — A08.4 VIRAL GASTROENTERITIS: ICD-10-CM

## 2019-01-23 DIAGNOSIS — E78.2 MIXED HYPERLIPIDEMIA: ICD-10-CM

## 2019-01-23 DIAGNOSIS — F02.80 LATE ONSET ALZHEIMER'S DISEASE WITHOUT BEHAVIORAL DISTURBANCE (H): ICD-10-CM

## 2019-01-23 DIAGNOSIS — G47.33 OBSTRUCTIVE SLEEP APNEA SYNDROME: ICD-10-CM

## 2019-01-23 DIAGNOSIS — Z91.81 RISK FOR FALLS: ICD-10-CM

## 2019-01-23 DIAGNOSIS — E11.65 TYPE 2 DIABETES MELLITUS WITH HYPERGLYCEMIA, WITHOUT LONG-TERM CURRENT USE OF INSULIN (H): ICD-10-CM

## 2019-01-23 DIAGNOSIS — M62.81 GENERALIZED MUSCLE WEAKNESS: ICD-10-CM

## 2019-01-23 DIAGNOSIS — R44.3 HALLUCINATIONS: Primary | ICD-10-CM

## 2019-01-23 DIAGNOSIS — M79.7 FIBROMYALGIA: ICD-10-CM

## 2019-01-23 DIAGNOSIS — G45.9 TIA (TRANSIENT ISCHEMIC ATTACK): ICD-10-CM

## 2019-01-23 DIAGNOSIS — G30.1 LATE ONSET ALZHEIMER'S DISEASE WITHOUT BEHAVIORAL DISTURBANCE (H): ICD-10-CM

## 2019-01-23 DIAGNOSIS — I10 BENIGN ESSENTIAL HYPERTENSION: ICD-10-CM

## 2019-01-23 DIAGNOSIS — N30.00 ACUTE CYSTITIS WITHOUT HEMATURIA: ICD-10-CM

## 2019-01-23 PROBLEM — E11.9 TYPE 2 DIABETES MELLITUS, WITHOUT LONG-TERM CURRENT USE OF INSULIN (H): Status: ACTIVE | Noted: 2019-01-23

## 2019-01-23 LAB
ALBUMIN UR-MCNC: NEGATIVE MG/DL
APPEARANCE UR: CLEAR
BACTERIA #/AREA URNS HPF: ABNORMAL /HPF
BILIRUB UR QL STRIP: NEGATIVE
COLOR UR AUTO: YELLOW
GLUCOSE UR STRIP-MCNC: 500 MG/DL
HGB UR QL STRIP: ABNORMAL
KETONES UR STRIP-MCNC: NEGATIVE MG/DL
LEUKOCYTE ESTERASE UR QL STRIP: NEGATIVE
NITRATE UR QL: NEGATIVE
NON-SQ EPI CELLS #/AREA URNS LPF: ABNORMAL /LPF
PH UR STRIP: 5 PH (ref 5–9)
RBC #/AREA URNS AUTO: ABNORMAL /HPF
SOURCE: ABNORMAL
SP GR UR STRIP: >1.03 (ref 1–1.03)
UROBILINOGEN UR STRIP-ACNC: 0.2 EU/DL (ref 0.2–1)
WBC #/AREA URNS AUTO: ABNORMAL /HPF

## 2019-01-23 PROCEDURE — 81001 URINALYSIS AUTO W/SCOPE: CPT | Performed by: NURSE PRACTITIONER

## 2019-01-23 PROCEDURE — G0463 HOSPITAL OUTPT CLINIC VISIT: HCPCS

## 2019-01-23 PROCEDURE — 99213 OFFICE O/P EST LOW 20 MIN: CPT | Performed by: NURSE PRACTITIONER

## 2019-01-23 RX ORDER — CEFDINIR 300 MG/1
300 CAPSULE ORAL 2 TIMES DAILY
Qty: 14 CAPSULE | Refills: 0 | Status: ON HOLD | OUTPATIENT
Start: 2019-01-23 | End: 2019-01-01

## 2019-01-23 ASSESSMENT — PAIN SCALES - GENERAL: PAINLEVEL: MODERATE PAIN (4)

## 2019-01-23 NOTE — NURSING NOTE
Chief Complaint   Patient presents with     Hallucinations     Diarrhea     nausea, headaches, gets cold easily, very fatigued.         Initial /78   Pulse 82   Temp 98.9  F (37.2  C) (Temporal)   Resp 18   SpO2 93%  There is no height or weight on file to calculate BMI.    Medication Reconciliation: complete      Norma J. Gosselin, LPN

## 2019-01-23 NOTE — PROGRESS NOTES
SUBJECTIVE:   Yumi Souza is a 86 year old female who presents to clinic today for the following health issues:    Diarrhea  Onset: 3 days    Description:   Consistency of stool: explosive for the last 2 days, none so far today  Blood in stool: no   Number of loose stools in past 24 hours: 1    Progression of Symptoms:  improving    Accompanying Signs & Symptoms:  Fever: no   Nausea or vomiting; YES,nausea today  Abdominal pain: no   Episodes of constipation: no   Weight loss: no     History:   Ill contacts: no   Recent use of antibiotics: no    Recent travels: no          Recent medication-new or changes(Rx or OTC): YES- started on insulin 12/18/18, no other changes    Precipitating factors:   nothing    Alleviating factors:   nothing    Therapies Tried and outcome:  none; Outcome: improving    Patient here with daughter and granddaughter, have concerns over hallucinations and diarrhea. States she has been having increased episodes of hallucinations over the last 2 weeks; has been seeing black bears in the living room, 15 naked babies in the room that nobody is paying attention to, dead babies buried in the ground, etc. She was recently moved from her longtime home in [place] here,  is suffering from lung cancer and is declining so they are living with her. Additional concerns of significant muscle weakness when compared to baseline.     Problem list and histories reviewed & adjusted, as indicated.  Additional history: as documented    Patient Active Problem List   Diagnosis     Benign essential hypertension     Obstructive sleep apnea syndrome     Fibromyalgia     Mixed hyperlipidemia     TIA (transient ischemic attack)     Late onset Alzheimer's disease without behavioral disturbance     Type 2 diabetes mellitus, without long-term current use of insulin (H)     Risk for falls     Past Surgical History:   Procedure Laterality Date     BIOPSY BREAST      benign     C TOTAL KNEE ARTHROPLASTY Right 1994      HERNIA REPAIR, UMBILICAL       LAPAROTOMY EXPLORATORY  2009     SPLENECTOMY         Social History     Tobacco Use     Smoking status: Never Smoker     Smokeless tobacco: Never Used   Substance Use Topics     Alcohol use: No     Frequency: Never     Family History   Problem Relation Age of Onset     Stomach Cancer Sister          at age 77     Bone Cancer Brother          at ae 63     Alzheimer Disease Mother          at age 86     Alzheimer Disease Father          at age 86     Melanoma Daughter          at age 56     Other - See Comments Son          at age 45, plane crash         Current Outpatient Medications   Medication Sig Dispense Refill     canagliflozin (INVOKANA) 300 MG tablet Take 1 tablet (300 mg) by mouth every morning (before breakfast) 90 tablet 3     cefdinir (OMNICEF) 300 MG capsule Take 1 capsule (300 mg) by mouth 2 times daily for 7 days 14 capsule 0     citalopram (CELEXA) 40 MG tablet Take 1 tablet (40 mg) by mouth daily 90 tablet 3     diltiazem (CARDIZEM) 60 MG tablet Take 1 tablet (60 mg) by mouth 4 times daily 360 tablet 3     hydrochlorothiazide (HYDRODIURIL) 25 MG tablet Take 1 tablet (25 mg) by mouth daily 90 tablet 3     insulin glargine (LANTUS SOLOSTAR PEN) 100 UNIT/ML pen Inject 10 Units Subcutaneous At Bedtime 9 mL 3     insulin pen needle (32G X 4 MM) 32G X 4 MM miscellaneous Use 1 pen needle daily or as directed. May dispense stock needle. 100 each 1     repaglinide (PRANDIN) 1 MG tablet Take 1 tablet (1 mg) by mouth 3 times daily (before meals) 270 tablet 3     sitagliptin (JANUVIA) 100 MG tablet Take 1 tablet (100 mg) by mouth daily 90 tablet 3     Allergies   Allergen Reactions     Aspirin Anaphylaxis     Amoxicillin Hives     Ciprofloxacin Hives     Lisinopril Cough     Sulfa Drugs Hives     Toradol [Ketorolac] Other (See Comments)     Confusion/sedation       Reviewed and updated as needed this visit by clinical staff  Tobacco  Allergies  Meds  Med  Hx  Surg Hx  Fam Hx  Soc Hx      Reviewed and updated as needed this visit by Provider  Tobacco  Allergies  Meds  Med Hx  Surg Hx  Fam Hx  Soc Hx        ROS:  As above, though mostly as reported by daughter and granddaughter who accompanied patient to office today.     OBJECTIVE:     /78   Pulse 82   Temp 98.9  F (37.2  C) (Temporal)   Resp 18   SpO2 93%   There is no height or weight on file to calculate BMI.  GENERAL: alert, no distress and elderly  EYES: Eyes grossly normal to inspection, PERRL and conjunctivae and sclerae normal  HENT: ear canals and TM's normal, nose and mouth without ulcers or lesions  NECK: no adenopathy, no asymmetry, masses, or scars and thyroid normal to palpation  RESP: lungs clear to auscultation - no rales, rhonchi or wheezes  CV: regular rate and rhythm, normal S1 S2, no S3 or S4, no murmur, click or rub, no peripheral edema and peripheral pulses strong  ABDOMEN: soft, nontender, bowel sounds normal and hernia umbilical  SKIN: no suspicious lesions or rashes  NEURO: Normal strength and tone, mentation intact and speech normal  PSYCH: confused and affect normal/bright, pleasant    Diagnostic Test Results:  Results for orders placed or performed in visit on 01/23/19 (from the past 24 hour(s))   *UA reflex to Microscopic   Result Value Ref Range    Color Urine Yellow     Appearance Urine Clear     Glucose Urine 500 (A) NEG^Negative mg/dL    Bilirubin Urine Negative NEG^Negative    Ketones Urine Negative NEG^Negative mg/dL    Specific Gravity Urine >1.030 (H) 1.000 - 1.030    Blood Urine Small (A) NEG^Negative    pH Urine 5.0 5.0 - 9.0 pH    Protein Albumin Urine Negative NEG^Negative mg/dL    Urobilinogen Urine 0.2 0.2 - 1.0 EU/dL    Nitrite Urine Negative NEG^Negative    Leukocyte Esterase Urine Negative NEG^Negative    Source Midstream Urine    Urine Microscopic   Result Value Ref Range    WBC Urine 10-25 (A) OTO5^0 - 5 /HPF    RBC Urine 2-5 (A) OTO2^O - 2 /HPF     Squamous Epithelial /LPF Urine Few FEW^Few /LPF    Bacteria Urine Many (A) NEG^Negative /HPF       ASSESSMENT/PLAN:     1. Hallucinations  Urine as above. Differentials also include manifestation of dementia and recent abrupt and total change in environment. Urine as above.  - *UA reflex to Microscopic  - Urine Microscopic    2. Acute cystitis without hematuria  - Take entire course of antibiotic even if you start to feel better.  - Antibiotics can cause stomach upset including nausea and diarrhea. Read your bottle or ask the pharmacist if antibiotic can be taken with food to help prevent nausea. If you have symptoms of diarrhea you can take an over-the-counter probiotic and/or increase foods with probiotics such as yogurt, Hal, sauerkraut.    - cefdinir (OMNICEF) 300 MG capsule; Take 1 capsule (300 mg) by mouth 2 times daily for 7 days  Dispense: 14 capsule; Refill: 0    3. Viral gastroenteritis  Likely viral etiology of diarrhea as described above, so far no episodes today. Daughter reports patient stated she was severely nauseated and did not want to leave the home for this appt, patient unaware of same and denies feeling nauseated at this time.     4. Generalized muscle weakness  5. Risk for falls  6. Fibromyalgia  Increased weakness noted by family, have increased concern for falls and does have history of fibromyalgia. Family interested in short-term rehab at [facility] as well as respite care when needed by family. 89 yr old  has been caring for the patient until moving in with daughter, he is suffering from lung cancer and his health is declining. Caregiver burnout a significant possibility. Would benefit from both short-term rehab and respite care when needed. Discussed same with Dr. Lozada, orders obtained for same. Granddaughter and daughter unsure if  would be willing to allow this, however, and will be speaking with him tonight.     7. Type 2 diabetes mellitus with hyperglycemia,  without long-term current use of insulin (H)  Started on insulin in December with daughter administering this daily. Last A1C in December remained elevated at 8.9. Due for follow up in March.     8. Late onset Alzheimer's disease without behavioral disturbance  Pleasant, though unreliable historian. No behaviors at this time, though family reports history of hallucinations at sun down.     9. TIA (transient ischemic attack)  History of.     10. Benign essential hypertension  Fairly controlled with medication.     11. Mixed hyperlipidemia  History of, medication not beneficial to patient.     12. Obstructive sleep apnea syndrome  Does not use CPAP machine.     Angela Clark NP  [facility] AND [facility]

## 2019-01-28 ENCOUNTER — TELEPHONE (OUTPATIENT)
Dept: FAMILY MEDICINE | Facility: OTHER | Age: 84
End: 2019-01-28

## 2019-01-28 DIAGNOSIS — G89.29 OTHER CHRONIC PAIN: ICD-10-CM

## 2019-01-28 DIAGNOSIS — M79.7 FIBROMYALGIA: Primary | ICD-10-CM

## 2019-01-29 RX ORDER — TRAMADOL HYDROCHLORIDE 50 MG/1
50 TABLET ORAL 3 TIMES DAILY PRN
Qty: 60 TABLET | Refills: 1 | Status: ON HOLD | OUTPATIENT
Start: 2019-01-29 | End: 2019-01-01

## 2019-03-21 PROBLEM — R53.1 GENERALIZED WEAKNESS: Status: ACTIVE | Noted: 2019-01-01

## 2019-03-21 NOTE — ED TRIAGE NOTES
Pt arrives via EMS. Pt lives at home with daughter. Pt has h/o dementia. Pt has not been acting as she normally does. Family concerned pt may have a urine infection. VSS.    Alivia Ríos RN on 3/21/2019 at 5:44 PM

## 2019-03-21 NOTE — ED PROVIDER NOTES
History   No chief complaint on file.    This is a 86-year-old female who resides at home with her family.  She has a history of dementia and the family reports she has not been acting normally over the past few days.  The family is concerned she may have a urinary tract infection.  She is brought in via EMS.  Furthermore the family reports she has had some candidal vaginosis.  On examination with the patient she states she is been having a cough and some chest pain as well.  Evaluation is limited due to patient's severe dementia.  Furthermore granddaughter reports that patient has been having a vaginal yeast infection.  She would like her to be treated for this.  The family feels the patient needs to be admitted to a SNF at this time.  They can no longer take care of her due to her worsening dementia.              Allergies:  Allergies   Allergen Reactions     Aspirin Anaphylaxis     Amoxicillin Hives     Ciprofloxacin Hives     Lisinopril Cough     Sulfa Drugs Hives     Toradol [Ketorolac] Other (See Comments)     Confusion/sedation       Problem List:    Patient Active Problem List    Diagnosis Date Noted     Type 2 diabetes mellitus, without long-term current use of insulin (H) 01/23/2019     Priority: Medium     Risk for falls 01/23/2019     Priority: Medium     Benign essential hypertension 12/20/2018     Priority: Medium     Obstructive sleep apnea syndrome 12/20/2018     Priority: Medium     Fibromyalgia 12/20/2018     Priority: Medium     Mixed hyperlipidemia 12/20/2018     Priority: Medium     TIA (transient ischemic attack) 12/20/2018     Priority: Medium     Late onset Alzheimer's disease without behavioral disturbance 12/20/2018     Priority: Medium        Past Medical History:    Past Medical History:   Diagnosis Date     CAD (coronary artery disease) 2012     Chronic insomnia      Depression      Diverticulitis 2013     DJD (degenerative joint disease)      Fen-phen history      Thyroid nodule,  toxic or with hyperthyroidism      TIA (transient ischemic attack)        Past Surgical History:    Past Surgical History:   Procedure Laterality Date     BIOPSY BREAST      benign     C TOTAL KNEE ARTHROPLASTY Right 1994     HERNIA REPAIR, UMBILICAL       LAPAROTOMY EXPLORATORY  2009     SPLENECTOMY         Family History:    Family History   Problem Relation Age of Onset     Stomach Cancer Sister          at age 77     Bone Cancer Brother          at ae 63     Alzheimer Disease Mother          at age 86     Alzheimer Disease Father          at age 86     Melanoma Daughter          at age 56     Other - See Comments Son          at age 45, plane crash       Social History:  Marital Status:   [5]  Social History     Tobacco Use     Smoking status: Never Smoker     Smokeless tobacco: Never Used   Substance Use Topics     Alcohol use: No     Frequency: Never     Drug use: No        Medications:      canagliflozin (INVOKANA) 300 MG tablet   citalopram (CELEXA) 40 MG tablet   diltiazem (CARDIZEM) 60 MG tablet   hydrochlorothiazide (HYDRODIURIL) 25 MG tablet   insulin glargine (LANTUS SOLOSTAR PEN) 100 UNIT/ML pen   insulin pen needle (32G X 4 MM) 32G X 4 MM miscellaneous   repaglinide (PRANDIN) 1 MG tablet   sitagliptin (JANUVIA) 100 MG tablet         Review of Systems   Constitutional: Positive for activity change and fatigue. Negative for appetite change, chills and fever.   HENT: Negative for congestion.    Eyes: Negative for visual disturbance.   Respiratory: Positive for cough. Negative for chest tightness and shortness of breath.    Cardiovascular: Positive for chest pain.   Gastrointestinal: Negative for abdominal pain, constipation, diarrhea, nausea and vomiting.   Genitourinary: Positive for frequency, urgency and vaginal discharge. Negative for hematuria.   Musculoskeletal: Negative for back pain.   Skin: Negative for rash and wound.   Neurological: Positive for weakness.  "Negative for dizziness, syncope and light-headedness.   Hematological: Negative for adenopathy. Does not bruise/bleed easily.   Psychiatric/Behavioral: Positive for confusion and hallucinations.       Physical Exam   Temp: 97.5  F (36.4  C)  Resp: 16  Height: 162.6 cm (5' 4\")  Weight: 81.6 kg (180 lb)  SpO2: 93 %      Physical Exam   Constitutional: She appears well-developed and well-nourished. No distress.   HENT:   Head: Normocephalic and atraumatic.   Eyes: Conjunctivae and EOM are normal. Pupils are equal, round, and reactive to light. No scleral icterus.   Neck: Neck supple.   Cardiovascular: Normal rate and regular rhythm.   Pulmonary/Chest: Effort normal.   Lung sounds clear but decreased throughout.  SaO2 is 93% on room air.   Abdominal: Soft. She exhibits no distension and no mass. There is no tenderness. There is no guarding.   Musculoskeletal: She exhibits no deformity.   Lymphadenopathy:     She has no cervical adenopathy.   Neurological: She is alert.   Alert but with noted dementia.   Skin: Skin is warm and dry. Capillary refill takes less than 2 seconds. No rash noted. She is not diaphoretic.   Psychiatric: She has a normal mood and affect.       ED Course        Procedures          EKG shows normal sinus rhythm.  Right bundle branch block.  T wave abnormality, consider lateral ischemia.  Heart rate is 62.  EKG shows T wave inversion in V1, V2, V3, V4,  And V5.  No previous EKG for comparison.      Results for orders placed or performed during the hospital encounter of 03/21/19 (from the past 24 hour(s))   *UA reflex to Microscopic   Result Value Ref Range    Color Urine Yellow     Appearance Urine Clear     Glucose Urine >1000 (A) NEG^Negative mg/dL    Bilirubin Urine Negative NEG^Negative    Ketones Urine Trace (A) NEG^Negative mg/dL    Specific Gravity Urine 1.020 1.000 - 1.030    Blood Urine Large (A) NEG^Negative    pH Urine 5.5 5.0 - 9.0 pH    Protein Albumin Urine Negative NEG^Negative mg/dL "    Urobilinogen Urine 0.2 0.2 - 1.0 EU/dL    Nitrite Urine Negative NEG^Negative    Leukocyte Esterase Urine Negative NEG^Negative    Source Catheterized Urine    Urine Microscopic   Result Value Ref Range    WBC Urine 0 - 5 OTO5^0 - 5 /HPF    RBC Urine 10-25 (A) OTO2^O - 2 /HPF    Squamous Epithelial /LPF Urine Few FEW^Few /LPF    Bacteria Urine Few (A) NEG^Negative /HPF    Amorphous Crystals Few (A) NEG^Negative /HPF   CBC with platelets differential   Result Value Ref Range    WBC 9.7 4.0 - 11.0 10e9/L    RBC Count 5.31 (H) 3.8 - 5.2 10e12/L    Hemoglobin 17.3 (H) 11.7 - 15.7 g/dL    Hematocrit 52.3 (H) 35.0 - 47.0 %    MCV 99 78 - 100 fl    MCH 32.6 26.5 - 33.0 pg    MCHC 33.1 31.5 - 36.5 g/dL    RDW 13.8 10.0 - 15.0 %    Platelet Count 214 150 - 450 10e9/L    Diff Method Automated Method     % Neutrophils 52.1 %    % Lymphocytes 38.2 %    % Monocytes 7.5 %    % Eosinophils 1.5 %    % Basophils 0.3 %    % Immature Granulocytes 0.4 %    Absolute Neutrophil 5.1 1.6 - 8.3 10e9/L    Absolute Lymphocytes 3.7 0.8 - 5.3 10e9/L    Absolute Monocytes 0.7 0.0 - 1.3 10e9/L    Absolute Eosinophils 0.2 0.0 - 0.7 10e9/L    Absolute Basophils 0.0 0.0 - 0.2 10e9/L    Abs Immature Granulocytes 0.0 0 - 0.4 10e9/L   Comprehensive metabolic panel   Result Value Ref Range    Sodium 134 134 - 144 mmol/L    Potassium 3.7 3.5 - 5.1 mmol/L    Chloride 96 (L) 98 - 107 mmol/L    Carbon Dioxide 30 21 - 31 mmol/L    Anion Gap 8 3 - 14 mmol/L    Glucose 121 (H) 70 - 105 mg/dL    Urea Nitrogen 16 7 - 25 mg/dL    Creatinine 0.80 0.60 - 1.20 mg/dL    GFR Estimate 68 >60 mL/min/[1.73_m2]    GFR Estimate If Black 82 >60 mL/min/[1.73_m2]    Calcium 9.6 8.6 - 10.3 mg/dL    Bilirubin Total 0.5 0.3 - 1.0 mg/dL    Albumin 3.7 3.5 - 5.7 g/dL    Protein Total 7.9 6.4 - 8.9 g/dL    Alkaline Phosphatase 93 34 - 104 U/L    ALT 15 7 - 52 U/L    AST 20 13 - 39 U/L   Lactic acid   Result Value Ref Range    Lactic Acid 1.1 0.5 - 2.2 mmol/L   Troponin I    Result Value Ref Range    Troponin I ES <0.030 0.000 - 0.034 ug/L   XR Chest 2 Views    Narrative    XR CHEST 2 VW    HISTORY: 86 years Female cough    COMPARISON: None    TECHNIQUE: 2 views of the chest were obtained.    FINDINGS: Two views of the chest were obtained. Heart size and  pulmonary vascularity are within normal limits, lungs are clear on  both views. No consolidating air space opacities are present.          Impression    IMPRESSION: Clear chest.    ANETTE HICKMAN MD       Medications   fluconazole (DIFLUCAN) tablet 150 mg (150 mg Oral Given 3/21/19 1903)   acetaminophen (TYLENOL) tablet 650 mg (not administered)   ondansetron (ZOFRAN-ODT) ODT tab 4 mg (not administered)     Or   ondansetron (ZOFRAN) injection 4 mg (not administered)   LORazepam (ATIVAN) injection 0.5 mg (not administered)     Or   LORazepam (ATIVAN) tablet 0.5 mg (not administered)       Assessments & Plan (with Medical Decision Making)     I have reviewed the nursing notes.    I have reviewed the findings, diagnosis, plan and need for follow up with the patient.         Medication List      ASK your doctor about these medications    cefdinir 300 MG capsule  Commonly known as:  OMNICEF  300 mg, Oral, 2 TIMES DAILY  Ask about: Should I take this medication?     * fluconazole 150 MG tablet  Commonly known as:  DIFLUCAN  150 mg, Oral, ONCE  Ask about: Should I take this medication?     * fluconazole 150 MG tablet  Commonly known as:  DIFLUCAN  150 mg, Oral, ONCE  Ask about: Should I take this medication?     hydrOXYzine 25 MG tablet  Commonly known as:  ATARAX  25-50 mg, Oral, EVERY 6 HOURS PRN  Ask about: Should I take this medication?     traMADol 50 MG tablet  Commonly known as:  ULTRAM  50 mg, Oral, 3 TIMES DAILY PRN  Ask about: Should I take this medication?         * This list has 2 medication(s) that are the same as other medications prescribed for you. Read the directions carefully, and ask your doctor or other care provider  to review them with you.                Final diagnoses:   Generalized muscle weakness   Dementia   Risk for falls   Late onset Alzheimer's disease without behavioral disturbance   Type 2 diabetes mellitus, without long-term current use of insulin (H)   Candidiasis of vagina     Afebrile.  Vital signs stable.  Patient with some increasing weakness and dementia as well as risk for falls.  She does have a candidiasis of vaginal area and patient was treated with Diflucan.  Family reports her dementia is worsening and they would like her to be placed in a SNF eventually.  They do understand this may take till tomorrow for this to happen.  They report increased difficulty with getting her to ambulate.  Her spouse is on hospice.  And they are looking for respite.  CBC shows hemoglobin at 17.3 and normal white blood cells in the left shift.  CMP is unremarkable.  UA shows no signs of UTI she does have some red blood cells and glucose in her urine.  But this is normal for her.  EKG shows normal sinus rhythm.  Right bundle branch block.  T wave abnormality, consider lateral ischemia.  Heart rate is 62.  EKG shows T wave inversion in V1, V2, V3, V4,  And V5.  No previous EKG for comparison.  Troponin is normal.  Lactate is normal.  She does have some minimal chest congestion and her SaO2 is been in the low 90s.  However chest x-ray appears to be normal.  I discussed the case with Dr. Becerra the patient will be admitted for observation overnight with her weakness and dementia.  She will be evaluated in the morning and the plan will be to place her in a SNF if no signs of improvement.  The patient's family understands that she does not qualify for 3-day stay.    Her daughter reports that she does work in the nursing home and understands this completely. 3/21/2019   [facility]     Finesse Gould PA-C  03/21/19 [id number]

## 2019-03-22 NOTE — PROGRESS NOTES
Gray letter was signed by granddaughter and copy was given.........Montserrat Onofre on 3/22/2019 at 8:59 AM

## 2019-03-22 NOTE — PHARMACY-ADMISSION MEDICATION HISTORY
Pharmacy -- Admission Medication Reconciliation    Prior to admission (PTA) medications were reviewed and the patient's PTA medication list was updated.    Sources Consulted:  ([place] & [place]), Interview with Patient granddaughter    The reliability of this Medication Reconciliation is: Reliability: Borderline reliable    The following significant changes were made:  -Repaglinide removed  -Tylenol added    In addition, the patient's allergies were reviewed with the patient and updated as follows:   Allergies: Aspirin; Amoxicillin; Ciprofloxacin; Lisinopril; Sulfa drugs; and Toradol [ketorolac]    The pharmacist has reviewed with the patient that all personal medications should be removed from the building or locked in the belongings safe.  Patient shall only take medications ordered by the physician and administered by the nursing staff.      Medication barriers identified: patient's daughter sets up medications and is unavailable for interview. Spoke with granddaughter instead. Granddaughter has rx bottles in her vehicle, but the patient is being transported to [facility] at 11:30.   Recommended rx bottles be brought into [facility] upon arrival    Medication adherence concerns: none    Understanding of emergency medications: n/a    Amor Holden [facility], 3/22/2019,  11:18 AM

## 2019-03-22 NOTE — ED NOTES
Pt DNR per [facility], granddtr states they have paperwork filled out and it should be on file at clinic.

## 2019-03-22 NOTE — PHARMACY - DISCHARGE MEDICATION RECONCILIATION AND EDUCATION
Pharmacy:  Discharge Counseling and Medication Reconciliation    Yumibehzad Souza  [address] [place] [id number]  [phone] (home)   86 year old female  PCP: Reba Mcgarry    Allergies: Aspirin; Amoxicillin; Ciprofloxacin; Lisinopril; Sulfa drugs; and Toradol [ketorolac]    Discharge Counseling:    No counseling provided as patient being discharged to SNF or Assisted Living  Discharge Medication Reconciliation:    Amor Holden RP has reviewed the patient's discharge medication orders and has compared them to the inpatient medication administration record and to what the patient was taking prior to admission - any discrepancies have been resolved.    It has been determined that the patient has an adequate supply of medications available or which can be obtained from [facility].    Thank you for the consult.    Amor Holden RPH........March 22, 2019 11:23 AM

## 2019-03-22 NOTE — PROGRESS NOTES
Admission Note    Data:  Yumi Souza admitted to 302 from emergency room at 10:30 pm.      Action:  Dr. Becerra has been notified of admission. Pt oriented to unit, call light in reach.     Response:  Patient tolerated transfer well and is resting in bed.    Faizan Mcgarry RN 03/21/19 10:41 PM

## 2019-03-22 NOTE — PROGRESS NOTES
03/22/19 1300   Quick Adds   Type of Visit Initial Occupational Therapy Evaluation   Cognitive Status Examination   Orientation not oriented to person, place or time   Level of Consciousness alert   Follows Commands (Cognition) 50-74% accuracy   Memory impaired   Attention Distractible during evaluation   Organization/Problem Solving Problem solving impaired   Pain Assessment   Patient Currently in Pain No   Range of Motion (ROM)   ROM Quick Adds No deficits were identified   Mobility   Bed Mobility Bed mobility skill: Supine to sit   Bed Mobility Skill: Supine to Sit   Level of Bond: Supine/Sit minimum assist (75% patients effort)   Transfer Skill: Sit to Stand   Level of Bond: Sit/Stand contact guard   Physical Assist/Nonphysical Assist: Sit/Stand 1 person assist   Upper Body Dressing   Level of Bond: Dress Upper Body maximum assist (25% patients effort)   Physical Assist/Nonphysical Assist: Dress Upper Body 1 person assist   Lower Body Dressing   Level of Bond: Dress Lower Body maximum assist (25% patients effort)   Physical Assist/Nonphysical Assist: Dress Lower Body 1 person assist   Grooming   Level of Bond: Grooming minimum assist (75% patients effort)   Physical Assist/Nonphysical Assist: Grooming 1 person assist   Clinical Impression   Criteria for Skilled Therapeutic Interventions Met evaluation only   OT Diagnosis impaired cognition and mobility    Influenced by the following impairments weakness and confusion    Assessment of Occupational Performance 1-3 Performance Deficits   Identified Performance Deficits mobility and self care    Clinical Decision Making (Complexity) Low complexity   Therapy Frequency (eval only )   Predicted Duration of Therapy Intervention (days/wks) N/A   Anticipated Equipment Needs at Discharge (none)   Anticipated Discharge Disposition Transitional Care Facility   Risks and Benefits of Treatment have been explained. Yes   Patient,  Family & other staff in agreement with plan of care Yes   Total Evaluation Time   Total Evaluation Time (Minutes) 15

## 2019-03-22 NOTE — PROGRESS NOTES
:    Met with privately with patient's granddaughter Margarita to discuss discharge planning.  Margarita stated that she is health care agent along with her mother Ora. Ora is primary care giver and needs a break as she is also caring for her father who is on hospice.  They would like to have patient discharged to the [facility] as Margarita works there.  They are aware that patient will be private pay and are able to pay $3500.00.  They are aware of local SNF options and chose the [facility].  They will need transportation by the [facility].     Referral faxed to [facility].  Spoke with [facility] and notified of referral.  They are able to accept.  Coordinated discharge transportation by the [facility] for 1100.    Completed PAS screening.  PAS confirmation number [id number].  Faxed to the [facility].     SIENNA Kelley on 3/22/2019 at 9:04 AM

## 2019-03-22 NOTE — PROGRESS NOTES
Patient has been discharged at this time via w/c status with transport aid to go to [facility]. Discharge paperwork sent with. Personal belongings sent with. All questions answered. Antonia Berry RN on 3/22/2019 at 12:07 PM

## 2019-03-22 NOTE — ED NOTES
"Granddaughter wants pt to go to a NS home from here, states that her mom can't \"handle\" taking care of her due to caring for another elderly male at her home.  Told her that she will need to discuss this with the PA.  "

## 2019-03-22 NOTE — TELEPHONE ENCOUNTER
I can take her if family wants or if she is on a vent or trach.  Flo To MD on 3/22/2019 at 11:55 AM

## 2019-03-22 NOTE — TELEPHONE ENCOUNTER
[facility] called and states that  agreed to take on this patient however he is not next in the nursing home book. If you could please confirm this and we can update the patients PCP in her chart. Thank You!

## 2019-03-22 NOTE — PLAN OF CARE
"Patient resting in room. Frequent re-orientation needed. Patient unable to define situation or place. Will continue to monitor. /66 (BP Location: Right arm)   Pulse 69   Temp 97.6  F (36.4  C) (Tympanic)   Resp 16   Ht 1.626 m (5' 4\")   Wt 81.6 kg (180 lb)   SpO2 94%   Breastfeeding? No   BMI 30.90 kg/m      Faizan Mcgarry RN 03/22/19 4:10 AM        "

## 2019-03-22 NOTE — DISCHARGE SUMMARY
"[facility]    History and Physical Discharge Summary  Hospitalist    Date of Admission:  3/21/2019  Date of Discharge:  3/22/2019  Discharging Provider: Bairon Becerra  Date of Service (when I saw the patient): 03/22/19    Discharge Diagnoses         Generalized weakness (3/21/2019)    Vaginal candidiasis    Dementia    History of Present Illness   Yumi Souza is an 86 year old female who presented with weakness. Per the ED note:  \"This is a 86-year-old female who resides at home with her family.  She has a history of dementia and the family reports she has not been acting normally over the past few days.  The family is concerned she may have a urinary tract infection.  She is brought in via EMS.  Furthermore the family reports she has had some candidal vaginosis.  On examination with the patient she states she is been having a cough and some chest pain as well.  Evaluation is limited due to patient's severe dementia.  Furthermore granddaughter reports that patient has been having a vaginal yeast infection.  She would like her to be treated for this.  The family feels the patient needs to be admitted to a SNF at this time.  They can no longer take care of her due to her worsening dementia.\"    Hospital Course   Yumi Souza was admitted on 3/21/2019.  The following problems were addressed during her hospitalization:    The patient had an uneventful night in the hospital. She was treated with a single dose of Fluconazole for candidiasis. SW was consults and arranged for discharge to [facility].    Bairon Becerra MD    Code Status   Full Code       Primary Care Physician   Reba Mcgarry    Physical Exam   Temp: 96.5  F (35.8  C) Temp src: Tympanic BP: 98/56 Pulse: 70   Resp: 20 SpO2: 90 % O2 Device: None (Room air)    Vitals:    03/21/19 1745   Weight: 81.6 kg (180 lb)     Vital Signs with Ranges  Temp:  [96.5  F (35.8  C)-99.2  F (37.3  C)] 96.5  F (35.8  C)  Pulse:  [62-89] " 70  Resp:  [16-20] 20  BP: ()/(45-97) 98/56  SpO2:  [90 %-95 %] 90 %  I/O last 3 completed shifts:  In: -   Out: 450 [Urine:450]    GENERAL: Comfortable, no apparent distress.  CARDIOVASCULAR: regular rate and rhythm, no murmur. No lower extremity edema   RESPIRATORY: Clear to auscultation bilaterally, no wheezes or crackles.  GI: non-tender, non-distended, normal bowel sounds.   SKIN: warm periphery, no rashes      Discharge Disposition   Discharged to nursing home  Condition at discharge: Stable    Consultations This Hospital Stay   PHYSICAL THERAPY ADULT IP CONSULT  SOCIAL WORK IP CONSULT  PHYSICAL THERAPY ADULT IP CONSULT  OCCUPATIONAL THERAPY ADULT IP CONSULT    Time Spent on this Encounter   IBairon, personally saw the patient today and spent less than or equal to 30 minutes discharging this patient.    Discharge Orders      General info for SNF    Length of Stay Estimate: Short Term Care: Estimated # of Days 31-90  Condition at Discharge: Stable  Level of care:skilled   Rehabilitation Potential: Fair  Admission H&P remains valid and up-to-date: Yes  Recent Chemotherapy: N/A  Use Nursing Home Standing Orders: Yes     Mantoux instructions    Give two-step Mantoux (PPD) Per Facility Policy Yes     Reason for your hospital stay    Dementia, weakness     Follow Up and recommended labs and tests    Follow up with Reba Mcgarry on 4/2 at 1245.     Activity - Up ad amado     Full Code     Physical Therapy Adult Consult    Evaluate and treat as clinically indicated.    Reason:  weakness     Occupational Therapy Adult Consult    Evaluate and treat as clinically indicated.    Reason:  Activities of daily living     Advance Diet as Tolerated    Follow this diet upon discharge: Orders Placed This Encounter      Consistent Carbohydrate Diet 4906-7475 Calories: Moderate Consistent CHO (4-6 CHO units/meal)       Discharge Medications   Current Discharge Medication List      CONTINUE these medications which have  NOT CHANGED    Details   canagliflozin (INVOKANA) 300 MG tablet Take 1 tablet (300 mg) by mouth every morning (before breakfast)  Qty: 90 tablet, Refills: 3      citalopram (CELEXA) 40 MG tablet Take 1 tablet (40 mg) by mouth daily  Qty: 90 tablet, Refills: 3      diltiazem (CARDIZEM) 60 MG tablet Take 1 tablet (60 mg) by mouth 4 times daily  Qty: 360 tablet, Refills: 3      hydrochlorothiazide (HYDRODIURIL) 25 MG tablet Take 1 tablet (25 mg) by mouth daily  Qty: 90 tablet, Refills: 3      insulin glargine (LANTUS SOLOSTAR PEN) 100 UNIT/ML pen Inject 10 Units Subcutaneous At Bedtime  Qty: 9 mL, Refills: 3    Associated Diagnoses: Type 2 diabetes mellitus with complication, without long-term current use of insulin (H)      insulin pen needle (32G X 4 MM) 32G X 4 MM miscellaneous Use 1 pen needle daily or as directed. May dispense stock needle.  Qty: 100 each, Refills: 1    Associated Diagnoses: Diabetes mellitus with insulin therapy (H)      repaglinide (PRANDIN) 1 MG tablet Take 1 tablet (1 mg) by mouth 3 times daily (before meals)  Qty: 270 tablet, Refills: 3      sitagliptin (JANUVIA) 100 MG tablet Take 1 tablet (100 mg) by mouth daily  Qty: 90 tablet, Refills: 3         STOP taking these medications       cefdinir (OMNICEF) 300 MG capsule Comments:   Reason for Stopping:         fluconazole (DIFLUCAN) 150 MG tablet Comments:   Reason for Stopping:         fluconazole (DIFLUCAN) 150 MG tablet Comments:   Reason for Stopping:         hydrOXYzine (ATARAX) 25 MG tablet Comments:   Reason for Stopping:         traMADol (ULTRAM) 50 MG tablet Comments:   Reason for Stopping:             Allergies   Allergies   Allergen Reactions     Aspirin Anaphylaxis     Amoxicillin Hives     Ciprofloxacin Hives     Lisinopril Cough     Sulfa Drugs Hives     Toradol [Ketorolac] Other (See Comments)     Confusion/sedation     Data   Most Recent 3 CBC's:  Recent Labs   Lab Test 03/21/19  1841 12/18/18  1501   WBC 9.7 9.0   HGB 17.3*  17.5*   MCV 99 97    162      Most Recent 3 BMP's:  Recent Labs   Lab Test 03/21/19  1841 12/18/18  1501    136   POTASSIUM 3.7 4.0   CHLORIDE 96* 100   CO2 30 28   BUN 16 16   CR 0.80 0.73   ANIONGAP 8 8   NARENDRA 9.6 9.6   * 214*     Most Recent 2 LFT's:  Recent Labs   Lab Test 03/21/19  1841 12/18/18  1501   AST 20 23   ALT 15 18   ALKPHOS 93 96   BILITOTAL 0.5 0.6     Most Recent INR's and Anticoagulation Dosing History:  Anticoagulation Dose History     There is no flowsheet data to display.        Most Recent 3 Troponin's:  Recent Labs   Lab Test 03/21/19  1841   TROPI <0.030     Most Recent Cholesterol Panel:No lab results found.  Most Recent 6 Bacteria Isolates From Any Culture (See EPIC Reports for Culture Details):No lab results found.  Most Recent TSH, T4 and A1c Labs:  Recent Labs   Lab Test 12/18/18  1501   A1C 8.9*     Results for orders placed or performed during the hospital encounter of 03/21/19   XR Chest 2 Views    Narrative    XR CHEST 2 VW    HISTORY: 86 years Female cough    COMPARISON: None    TECHNIQUE: 2 views of the chest were obtained.    FINDINGS: Two views of the chest were obtained. Heart size and  pulmonary vascularity are within normal limits, lungs are clear on  both views. No consolidating air space opacities are present.          Impression    IMPRESSION: Clear chest.    ANETTE HICKMAN MD

## 2019-03-22 NOTE — PROGRESS NOTES
03/22/19 1200   Quick Adds   Type of Visit Initial PT Evaluation   Living Environment   Lives With child(jackson), adult   Living Arrangements house   Home Accessibility no concerns   Transportation Anticipated family or friend will provide   Self-Care   Usual Activity Tolerance fair   Current Activity Tolerance fair   Regular Exercise No   Equipment Currently Used at Home walker, rolling   Functional Level Prior   Ambulation 1-->assistive equipment   Transferring 1-->assistive equipment   Toileting 1-->assistive equipment   Bathing 1-->assistive equipment   Communication 2-->difficulty understanding (not related to language barrier)   Swallowing 0-->swallows foods/liquids without difficulty   Cognition 1 - attention or memory deficits   Fall history within last six months no   Which of the above functional risks had a recent onset or change? ambulation   General Information   Referring Physician Hernan   Patient/Family Goals Statement short term rehab   Precautions/Limitations fall precautions   Weight-Bearing Status - LLE full weight-bearing   Weight-Bearing Status - RLE full weight-bearing   General Info Comments fatigue with activity   Cognitive Status Examination   Orientation person   Level of Consciousness alert   Follows Commands and Answers Questions 75% of the time   Personal Safety and Judgment impulsive;impaired   Memory impaired   Pain Assessment   Patient Currently in Pain No   Integumentary/Edema   Integumentary/Edema Comments appears intact    Posture    Posture Protracted shoulders   Range of Motion (ROM)   ROM Comment WFL LEs   Strength   Strength Comments WFL LEs, however, patient fatigues with activity   Bed Mobility   Bed Mobility Comments minimal assist   Transfer Skills   Transfer Comments minimal assist with use of Fww   Gait   Gait Comments CGA with use of Fww   Balance   Balance Comments fair with use of Fww   Sensory Examination   Sensory Perception Comments appears intact to light touch in  LEs   Coordination   Coordination Comments appears intact   General Therapy Interventions   Planned Therapy Interventions gait training   Intervention Comments patient will benefit from continued PT to promote increased functional activity tolerance   Clinical Impression   Criteria for Skilled Therapeutic Intervention evaluation only   PT Diagnosis impaired mobility   Influenced by the following impairments fatigue   Functional limitations due to impairments activity tolerance   Clinical Presentation Stable/Uncomplicated   Clinical Decision Making (Complexity) Low complexity   Predicted Duration of Therapy Intervention (days/wks) one time acutely   Anticipated Equipment Needs at Discharge front wheeled walker   Anticipated Discharge Disposition Transitional Care Facility   Risk & Benefits of therapy have been explained Yes   Patient, Family & other staff in agreement with plan of care Yes   Total Evaluation Time   Total Evaluation Time (Minutes) 15

## 2019-03-22 NOTE — PLAN OF CARE
"Patient admitted for worsening dementia, family concerns of a UTI and yeast infection. Family requesting that patient be admitted to SNF for further care. Lab results negative for UTI, treated with diflucan today. Patient disorientated to situation and place. Frequent reorientation. Lung sounds clear and bowel sounds active. Patient noted to complain of dizziness when sitting up. Ambulates with use of walker and 2 assist. Patient complained of \"moderate\" headache at 2321 that was treated with Tylenol. Also, PRN Ativan administered at 2321, see MAR. Vitals stable, /45   Pulse 72   Temp 99.2  F (37.3  C) (Tympanic)   Resp 16   Ht 1.626 m (5' 4\")   Wt 81.6 kg (180 lb)   SpO2 94%   Breastfeeding? No   BMI 30.90 kg/m      Faizan Mcgarry RN 03/22/19 12:45 AM        "

## 2019-03-22 NOTE — PROGRESS NOTES
Nurse to nurse to Rosenda RN @ [facility]. All questions answered. Antonia Berry, RN on 3/22/2019 at 10:16 AM

## 2019-03-22 NOTE — PLAN OF CARE
Occupational Therapy Discharge Summary    Reason for therapy discharge:    Discharged to transitional care facility.    Progress towards therapy goal(s). See goals on Care Plan in [facility] electronic health record for goal details.  Goals not met.  Barriers to achieving goals:   discharge from facility.    Therapy recommendation(s):    Continued therapy is recommended.  Rationale/Recommendations:  on going therapies as appropriate .

## 2019-03-22 NOTE — PROGRESS NOTES
:    Per Erum at the [facility] transportation was delayed to 1130.    Patient's granddaughter Margarita will be faxing patient's health care directive to be scanned into patient's chart.    SIENNA Kelley on 3/22/2019 at 10:31 AM

## 2019-03-22 NOTE — PLAN OF CARE
Physical Therapy Discharge Summary    Reason for therapy discharge:    Discharged to transitional care facility.    Progress towards therapy goal(s). See goals on Care Plan in [facility] electronic health record for goal details.  Patient demonstrating functional mobility with assist and encouragement to participate     Therapy recommendation(s):    Continued therapy is recommended.  Rationale/Recommendations:  to promote patient's highest level of functional mobility and activity tolerance.

## 2019-03-25 NOTE — TELEPHONE ENCOUNTER
Having a hard time calling the nursing home and no return call from E[facility], faxed note to Josee Brunner LPN on 3/25/2019 at 10:16 AM

## 2019-03-29 NOTE — PROGRESS NOTES
Visit Date:   03/28/2019      CHIEF COMPLAINT:  Initial nurse practitioner evaluation for insomnia, anxiety and pain.        HISTORY OF PRESENT ILLNESS:  The patient's granddaughter, Margarita Gaytan, is here today.  She works at the facility.  She reports that her grandmother was seen in the hospital for weakness and had a vaginal candidal infection.  This was treated.  She was not able to be taking care of at home any longer.  She does have severe dementia, and her  is on hospice with lung cancer.  The patient's daughter was taking care of both of them at home and it became too difficult.  The patient has been doing fine since she has been at the [facility].  She has type 2 diabetes, which is well controlled, and her sugars range from 101-232.  She has her sugars checked 4 times daily.  She is on Lantus, Invokana, sitagliptin and repaglinide.  She is tolerating these medications without difficulty.  Her granddaughter reports that she does have a history of awaking at night from what appears to be nightmares, but then complaining of pain.  The pain can be on different spots of her body.  Normally, they would give her a Tylenol and a tramadol and she would calm down and be able to go back to sleep.  She does have obstructive sleep apnea, but she refuses CPAP.  She had tramadol discontinued when she was at the hospital.  She does have an order for Tylenol as needed.  She also seems to have anxiety more in the evenings.  Granddaughter is wondering if she can have something to help with this.  She finds that she will be awake for several days and then go to sleep.  The first couple of nights she was at the [facility], she was awake and then did sleep during the day.  She also would like to get some melatonin to help at bedtime as well.  She has not had any further complaints about vaginal itching.  She was treated for a vaginal yeast infection.  She has known atherosclerotic cardiovascular disease and hypertension, and she  is on diltiazem and HCTZ.      PAST MEDICAL HISTORY, PAST SURGICAL HISTORY, ALLERGIES, MEDICATIONS, RISK FACTORS, AND SOCIAL HISTORY:  Reviewed.      REVIEW OF SYSTEMS:  A 12-point complete review of systems discussed with nursing staff, granddaughter, and resident.  See HPI for positive findings.      PHYSICAL EXAMINATION:   VITAL SIGNS:  Blood pressure 137/70, pulse 76, respiratory rate 18, temperature 98.7, SpO2 92% on room air.  Weight 189 pounds.   GENERAL:  Pleasant female seen in therapy, no acute distress.  She does have dementia, but does answer questions.   HEENT:  Sclerae nonicteric.  Conjunctivae not inflamed.  Mucous membranes moist.   NECK:  Supple without adenopathy.  No thyromegaly.   LUNGS:  Fields clear to auscultation.   CARDIOVASCULAR:  Regular rate and rhythm.  No murmurs.   ABDOMEN:  Soft without tenderness.   EXTREMITIES:  With trace bilateral edema.  Gross motor skills and range of motion are intact.      ASSESSMENT:   1.  Severe dementia.   2.  Major depressive disorder with anxiety.   3.  Insomnia.   4.  Osteoarthritis.   5.  Obstructive sleep apnea.   6.  Atherosclerotic cardiovascular disease.   7.  Type 2 diabetes.   8.  Vaginal Candida.      PLAN:  Hospitalization records, labs, and diagnostic studies are reviewed.  We will check an A1c next lab day.  Decrease blood sugar checks down to twice daily at alternating times.  Start her on lorazepam 0.5 mg daily as needed for anxiety.  Also, we will give an order for tramadol 50 mg 1 tablet twice daily as needed for pain and may use melatonin 2 mg every bedtime.  We will check back on her again in a couple of weeks, sooner with concerns.         DOREEN OCHOA NP             D: 2019   T: 2019   MT: ROBE      Name:     ELENA VARGAS   MRN:      [id number]        Account:      [id number]   :      1932           Visit Date:   2019      Document: [id number]       cc: Kimmie sahu [place]

## 2019-04-12 NOTE — PROGRESS NOTES
Visit Date:   2019      CHIEF COMPLAINT:  Followup type 2 diabetes.      HISTORY OF PRESENT ILLNESS:  The patient had A1c drawn earlier this week, and it was a little elevated at 8.5%.  She currently is on Invokana, sitagliptin, repaglinide and also Lantus 10 units daily.  She has sugars checked twice daily at alternating times, and these range from .  She does not have any symptoms of polyphagia, polydipsia or polyuria.  There has been no hypoglycemia.  Appetite is good, and she otherwise is doing well.      PAST MEDICAL HISTORY, ALLERGIES, AND MEDICATIONS:  Reviewed.      PHYSICAL EXAMINATION:   VITAL SIGNS:  Blood pressure 127/69, pulse 65, respiratory rate 18, temperature 97.7, SpO2 of 94% on room air.   GENERAL:  Pleasant female in no acute distress.      LABORATORY:  Discussed above.      ASSESSMENT:  Type 2 diabetes.      PLAN:  Increase Lantus to 13 units at bedtime.  I do believe that the Lantus was just started in the hospital.  We will just plan to reassess her A1c again in 3 months.         DOREEN OCHOA NP             D: 2019   T: 2019   MT:       Name:     ELENA VARGAS   MRN:      [id number]        Account:      [id number]   :      1932           Visit Date:   2019      Document: [id number]       cc: [facility]        Flo To MD

## 2019-05-02 NOTE — NURSING NOTE
"Coming in for a re-cert for the nursing home    Chief Complaint   Patient presents with     RE-CERT       Initial /62 (BP Location: Right arm, Patient Position: Sitting, Cuff Size: Adult Large)   Pulse 62   Temp 98.6  F (37  C) (Tympanic)   Resp 14   SpO2 97%  Estimated body mass index is 30.9 kg/m  as calculated from the following:    Height as of 3/21/19: 1.626 m (5' 4\").    Weight as of 3/21/19: 81.6 kg (180 lb).  Medication Reconciliation: complete    Kay Brunner LPN  "

## 2019-05-02 NOTE — PROGRESS NOTES
SUBJECTIVE:   Yumi Souza is a 86 year old female who presents to clinic today for the following health issues:    HPI  Here with grand daughter who works at the nursing home where she lives.  Here for 60 day recertification.  Has been in the nursing home since 3/22.  Was living with her daughter before this.  Pt is not aware she lives in the nursing home.  Family has no worries at all about her.  Is DNR/DNI.  Walks with a 4 wheeled walker.  Significant knee pain from DJD at times.        Recent Labs   Lab Test 04/03/19  0805 03/21/19  1841 12/18/18  1501   A1C 8.5*  --  8.9*   ALT  --  15 18   CR  --  0.80 0.73   GFRESTIMATED  --  68 76   GFRESTBLACK  --  82 >90   POTASSIUM  --  3.7 4.0            Patient Active Problem List    Diagnosis Date Noted     Generalized weakness 03/21/2019     Priority: Medium     Type 2 diabetes mellitus, without long-term current use of insulin (H) 01/23/2019     Priority: Medium     Risk for falls 01/23/2019     Priority: Medium     Benign essential hypertension 12/20/2018     Priority: Medium     Obstructive sleep apnea syndrome 12/20/2018     Priority: Medium     Fibromyalgia 12/20/2018     Priority: Medium     Mixed hyperlipidemia 12/20/2018     Priority: Medium     TIA (transient ischemic attack) 12/20/2018     Priority: Medium     Late onset Alzheimer's disease without behavioral disturbance 12/20/2018     Priority: Medium     Past Surgical History:   Procedure Laterality Date     BIOPSY BREAST      benign     C TOTAL KNEE ARTHROPLASTY Right 1994     HERNIA REPAIR, UMBILICAL       LAPAROTOMY EXPLORATORY  2009     SPLENECTOMY       Social History     Tobacco Use     Smoking status: Never Smoker     Smokeless tobacco: Never Used   Substance Use Topics     Alcohol use: No     Frequency: Never     Current Outpatient Medications   Medication Sig Dispense Refill     acetaminophen (TYLENOL) 325 MG tablet Take 325-650 mg by mouth every 6 hours as needed for mild pain        canagliflozin (INVOKANA) 300 MG tablet Take 1 tablet (300 mg) by mouth every morning (before breakfast) 90 tablet 3     citalopram (CELEXA) 40 MG tablet Take 1 tablet (40 mg) by mouth daily 90 tablet 3     diltiazem (CARDIZEM) 60 MG tablet Take 1 tablet (60 mg) by mouth 4 times daily 360 tablet 3     hydrochlorothiazide (HYDRODIURIL) 25 MG tablet Take 1 tablet (25 mg) by mouth daily 90 tablet 3     insulin glargine (LANTUS SOLOSTAR PEN) 100 UNIT/ML pen Inject 10 Units Subcutaneous At Bedtime 9 mL 3     insulin pen needle (32G X 4 MM) 32G X 4 MM miscellaneous Use 1 pen needle daily or as directed. May dispense stock needle. 100 each 1     sitagliptin (JANUVIA) 100 MG tablet Take 1 tablet (100 mg) by mouth daily 90 tablet 3     Allergies   Allergen Reactions     Aspirin Anaphylaxis     Amoxicillin Hives     Ciprofloxacin Hives     Lisinopril Cough     Sulfa Drugs Hives     Toradol [Ketorolac] Other (See Comments)     Confusion/sedation       Review of Systems   Reason unable to perform ROS: dementia.        OBJECTIVE:     /62 (BP Location: Right arm, Patient Position: Sitting, Cuff Size: Adult Large)   Pulse 62   Temp 98.6  F (37  C) (Tympanic)   Resp 14   SpO2 97%   There is no height or weight on file to calculate BMI.  Physical Exam   Constitutional: She appears well-developed. No distress.   HENT:   Head: Normocephalic and atraumatic.   Cardiovascular: Normal rate, regular rhythm and normal heart sounds. Exam reveals no friction rub.   No murmur heard.  Pulmonary/Chest: Effort normal. No stridor. No respiratory distress. She has no wheezes. She has no rales.   Neurological: She is alert.   Oriented x 1.   Skin: Skin is warm and dry. She is not diaphoretic.   Psychiatric: She has a normal mood and affect.   Very poor memory, but conversant       Diagnostic Test Results:  none     ASSESSMENT/PLAN:         (G30.1,  F02.80) Late onset Alzheimer's disease without behavioral disturbance  (primary encounter  diagnosis)  Comment: end stage.  In the nursing home now with supportive cares  Plan: LORazepam (ATIVAN) 0.5 MG tablet             (E11.65) Type 2 diabetes mellitus with hyperglycemia, without long-term current use of insulin (H)  Comment: stable  Plan: liberal control.  Will stop her meds and change to metformin due to cost. XR version due to possible diarrhea in the past on this.  If not working, then can go back    (I10) Benign essential hypertension  Comment:  stable  Plan:      (Z91.81) Risk for falls  Comment: at nursing home now.  Plan:        Flo To MD  [facility] AND [facility]

## 2019-05-12 NOTE — ED TRIAGE NOTES
"Patient presents to the ED from the [facility] after an \"incident\". Per report from EMS staff called EMS after the patient saw someone going into the bathroom and told them that \"she would hurt them if they went in there\".  Patient has a hx of dementia and per patients granddaughter who is at the bedside, she states the patient has a 5 minute memory span.  Patient currently denies SI/HI.  "

## 2019-05-12 NOTE — ED PROVIDER NOTES
History     Chief Complaint   Patient presents with     Suicidal     HPI  Yumi Souza is a 86 year old female who was brought to the emergency room via EMS from her nursing facility which she was just placed into in March after she someone going into the bathroom and yelled that she was going to hurt hurt them if they went in there.  Her granddaughter who is here with her and works at the facility states that her understanding was that someone walked into to the bathroom she was in it that is when she yelled out.  She does have a history of dementia.  She has not struck anyone to the granddaughter's report nor the report from the nursing facility.  Granddaughter states she has about a 5-minute memory span.  The patient denies the desire to hurt anyone and denies any desire to hurt herself.    Allergies:  Allergies   Allergen Reactions     Aspirin Anaphylaxis     Amoxicillin Hives     Ciprofloxacin Hives     Lisinopril Cough     Sulfa Drugs Hives     Toradol [Ketorolac] Other (See Comments)     Confusion/sedation       Problem List:    Patient Active Problem List    Diagnosis Date Noted     Generalized weakness 03/21/2019     Priority: Medium     Type 2 diabetes mellitus, without long-term current use of insulin (H) 01/23/2019     Priority: Medium     Risk for falls 01/23/2019     Priority: Medium     Benign essential hypertension 12/20/2018     Priority: Medium     Obstructive sleep apnea syndrome 12/20/2018     Priority: Medium     Fibromyalgia 12/20/2018     Priority: Medium     Mixed hyperlipidemia 12/20/2018     Priority: Medium     TIA (transient ischemic attack) 12/20/2018     Priority: Medium     Late onset Alzheimer's disease without behavioral disturbance 12/20/2018     Priority: Medium        Past Medical History:    Past Medical History:   Diagnosis Date     CAD (coronary artery disease) 2012     Chronic insomnia      Depression      Diverticulitis 2013     DJD (degenerative joint disease)       Fen-phen history      Thyroid nodule, toxic or with hyperthyroidism      TIA (transient ischemic attack)        Past Surgical History:    Past Surgical History:   Procedure Laterality Date     BIOPSY BREAST      benign     C TOTAL KNEE ARTHROPLASTY Right      HERNIA REPAIR, UMBILICAL       LAPAROTOMY EXPLORATORY  2009     SPLENECTOMY         Family History:    Family History   Problem Relation Age of Onset     Stomach Cancer Sister          at age 77     Bone Cancer Brother          at ae 63     Alzheimer Disease Mother          at age 86     Alzheimer Disease Father          at age 86     Melanoma Daughter          at age 56     Other - See Comments Son          at age 45, plane crash       Social History:  Marital Status:   [5]  Social History     Tobacco Use     Smoking status: Never Smoker     Smokeless tobacco: Never Used   Substance Use Topics     Alcohol use: No     Frequency: Never     Drug use: No        Medications:      acetaminophen (TYLENOL) 325 MG tablet   citalopram (CELEXA) 40 MG tablet   diltiazem (CARDIZEM) 60 MG tablet   hydrochlorothiazide (HYDRODIURIL) 25 MG tablet   insulin glargine (LANTUS SOLOSTAR PEN) 100 UNIT/ML pen   insulin pen needle (32G X 4 MM) 32G X 4 MM miscellaneous   LORazepam (ATIVAN) 0.5 MG tablet   melatonin 1 MG TABS tablet   metFORMIN (GLUCOPHAGE-XR) 500 MG 24 hr tablet   repaglinide (PRANDIN) 1 MG tablet   sitagliptin (JANUVIA) 100 MG tablet         Review of Systems   Unable to perform ROS: Dementia       Physical Exam   BP: 134/78  Pulse: 85  Heart Rate: 85  Temp: 99.3  F (37.4  C)  Resp: 18  Weight: 81.6 kg (180 lb)  SpO2: 99 %      Physical Exam   Constitutional: She appears well-developed and well-nourished. No distress.   HENT:   Head: Normocephalic and atraumatic.   Right Ear: External ear normal.   Left Ear: External ear normal.   Nose: Nose normal.   Mouth/Throat: Oropharynx is clear and moist.   Eyes: Pupils are equal, round,  and reactive to light. Conjunctivae and EOM are normal. Right eye exhibits no discharge. Left eye exhibits no discharge.   Neck: Normal range of motion. Neck supple. No thyromegaly present.   Cardiovascular: Normal rate, regular rhythm and normal heart sounds.   Pulmonary/Chest: Effort normal and breath sounds normal. No stridor. No respiratory distress. She has no wheezes.   Abdominal: Soft. Bowel sounds are normal. She exhibits no distension. There is no tenderness. There is no guarding.   Musculoskeletal: Normal range of motion.   Lymphadenopathy:     She has no cervical adenopathy.   Neurological: She is alert.   Patient was unaware what day it was answering different months of the year multiple times.  She was unable to initially tell me her granddaughters name, but after thinking about it she finally came up with it.  She was able to name herself and her date of birth.  She did not know where she was at.  She does not know where she is living although she was told multiple times that she was living in a nursing facility that hergrand daughter works in.   Skin: Skin is warm and dry. Capillary refill takes less than 2 seconds. She is not diaphoretic.   Nursing note and vitals reviewed.      ED Course   Patient was seen and examined.  Labs ordered.  CRT was consulted and she does not fit CRT criteria for evaluation for dementia she is already diagnosed and she is not having any episodes that have hurt anyone.     Procedures     Results for orders placed or performed during the hospital encounter of 05/12/19 (from the past 24 hour(s))   *UA reflex to Microscopic   Result Value Ref Range    Color Urine Yellow     Appearance Urine Clear     Glucose Urine Negative NEG^Negative mg/dL    Bilirubin Urine Negative NEG^Negative    Ketones Urine Negative NEG^Negative mg/dL    Specific Gravity Urine 1.010 1.000 - 1.030    Blood Urine Negative NEG^Negative    pH Urine 6.0 5.0 - 9.0 pH    Protein Albumin Urine Negative  NEG^Negative mg/dL    Urobilinogen Urine 0.2 0.2 - 1.0 EU/dL    Nitrite Urine Negative NEG^Negative    Leukocyte Esterase Urine Negative NEG^Negative    Source Midstream Urine    CBC with platelets differential   Result Value Ref Range    WBC 9.7 4.0 - 11.0 10e9/L    RBC Count 4.78 3.8 - 5.2 10e12/L    Hemoglobin 15.5 11.7 - 15.7 g/dL    Hematocrit 45.9 35.0 - 47.0 %    MCV 96 78 - 100 fl    MCH 32.4 26.5 - 33.0 pg    MCHC 33.8 31.5 - 36.5 g/dL    RDW 13.2 10.0 - 15.0 %    Platelet Count 218 150 - 450 10e9/L    Diff Method Automated Method     % Neutrophils 55.4 %    % Lymphocytes 35.6 %    % Monocytes 6.0 %    % Eosinophils 2.2 %    % Basophils 0.4 %    % Immature Granulocytes 0.4 %    Absolute Neutrophil 5.4 1.6 - 8.3 10e9/L    Absolute Lymphocytes 3.5 0.8 - 5.3 10e9/L    Absolute Monocytes 0.6 0.0 - 1.3 10e9/L    Absolute Eosinophils 0.2 0.0 - 0.7 10e9/L    Absolute Basophils 0.0 0.0 - 0.2 10e9/L    Abs Immature Granulocytes 0.0 0 - 0.4 10e9/L   Comprehensive metabolic panel   Result Value Ref Range    Sodium 133 (L) 134 - 144 mmol/L    Potassium 3.6 3.5 - 5.1 mmol/L    Chloride 98 98 - 107 mmol/L    Carbon Dioxide 28 21 - 31 mmol/L    Anion Gap 7 3 - 14 mmol/L    Glucose 102 70 - 105 mg/dL    Urea Nitrogen 13 7 - 25 mg/dL    Creatinine 0.65 0.60 - 1.20 mg/dL    GFR Estimate 86 >60 mL/min/[1.73_m2]    GFR Estimate If Black >90 >60 mL/min/[1.73_m2]    Calcium 9.1 8.6 - 10.3 mg/dL    Bilirubin Total 0.3 0.3 - 1.0 mg/dL    Albumin 3.4 (L) 3.5 - 5.7 g/dL    Protein Total 7.2 6.4 - 8.9 g/dL    Alkaline Phosphatase 79 34 - 104 U/L    ALT 9 7 - 52 U/L    AST 13 13 - 39 U/L       Medications - No data to display    Assessments & Plan (with Medical Decision Making)     I have reviewed the nursing notes.    I have reviewed the findings, diagnosis, plan and need for follow up with the patient.         Medication List      There are no discharge medications for this visit.         Final diagnoses:   Dementia with behavioral  disturbance, unspecified dementia type       5/12/2019   [facility] \[facility] [place] [facility]\""Melba MD  05/12/19 9225

## 2019-05-12 NOTE — ED AVS SNAPSHOT
[facility]  [address]  [place] [address]  Phone:  [phone]  Fax:  [phone]                                    Yumi Souza   MRN: [id number]    Department:  [facility]   Date of Visit:  5/12/2019           After Visit Summary Signature Page    I have received my discharge instructions, and my questions have been answered. I have discussed any challenges I see with this plan with the nurse or doctor.    ..........................................................................................................................................  Patient/Patient Representative Signature      ..........................................................................................................................................  Patient Representative Print Name and Relationship to Patient    ..................................................               ................................................  Date                                   Time    ..........................................................................................................................................  Reviewed by Signature/Title    ...................................................              ..............................................  Date                                               Time          22EPIC Rev 08/18

## 2019-05-14 NOTE — PROGRESS NOTES
Visit Date:   05/13/2019      NURSING HOME:  [facility].       CHIEF COMPLAINT:  Followup ER visit for dementia with behavior.      HISTORY OF PRESENT ILLNESS:  The patient over the weekend was sent over to the Emergency Department with bizarre statements.  She had said to her roommate that she wanted to kill her.  She was evaluated in the Emergency Department and it was felt that this was likely secondary to her underlying severe dementia.  She did have blood work that day which included a CBC, chemistry panel and a urinalysis which were unremarkable.  When she returned back to the facility, staff realized that she made this comment when she was trying to get into the restroom in her room.  Her roommate was using the restroom at that time and she was frustrated.      The patient has had more problems with diarrhea.  This started after she was switched over to metformin ER on 05/02.  She tells me today that she cannot recall any abdominal pain or diarrhea, but tells me that she cannot remember anything.  Nursing staff report to me that she usually has diarrhea a few times daily.  It is not explosive, but she is incontinent of diarrhea.  She has not complained of any abdominal pain nor has she had fever, chills, rectal bleeding or black stools.  She has had diarrhea in the past with metformin, but she was never on metformin ER.  By reviewing her blood sugars, these ranged from  and her last A1c was 8.5%.  She refused to have her vital signs taken today.      Pharmacy is also recommending a review of her lorazepam.  This review was done earlier in the month and at that time, the patient had not received any lorazepam since 03/28 and they were wondering if it should be discontinued.  However, now she has used the medication 3 times this month because of her recent behaviors.  They are also wondering if she needs to continue with the citalopram at 40 mg daily as I feel that this likely is too high of a dose.  The  patient came to this facility on this dose.      PAST MEDICAL HISTORY, PAST SURGICAL HISTORY, ALLERGY, MEDICATION, RISK FACTORS, SOCIAL HISTORY:  All reviewed.      REVIEW OF SYSTEMS:  Attempted and unable to obtain accurate information through resident due to her severe underlying dementia.  Nursing staff:  10-point review of systems were discussed with positive findings mentioned in HPI, otherwise unremarkable.      PHYSICAL EXAMINATION:   GENERAL:  A pleasant female who appears in no acute distress.   VITAL SIGNS:  As of 04/28, blood pressure 120/65, pulse 77, temperature 98.9, respiratory rate 17.  The patient has refused vitals since that time.  Skin color pink.  Sclerae nonicteric.  Mucous membranes moist.   NECK:  Supple without adenopathy.  No thyromegaly.   LUNGS:  Fields clear to auscultation throughout.   CARDIOVASCULAR:  Regular rate and rhythm.   ABDOMEN:  Soft with normoactive bowel sounds x 4 quadrants.  No tenderness, masses or organomegaly.  No suprapubic tenderness.   EXTREMITIES:  With trace bilateral edema.      Emergency Department notes, laboratory and diagnostic studies were reviewed and discussed.      ASSESSMENT:   1.  Delirium.   2.  Dementia with behaviors.   3.  Type 2 diabetes.      PLAN:  At this time, she likely has developed a delirium with her acute onset of diarrhea since her medication change.  We will recommend discontinuing the metformin ER.  Restart the canagliflozin 300 mg daily.  If she continues to have the threatening remarks after cessation of the diarrhea, then would recommend psychiatric evaluation.  If the diarrhea does not resolve, then I would like to see her in followup.  In the meantime with her delirium, would like to continue with as needed lorazepam and the citalopram dose is currently prescribed.  Once the diarrhea has resolved, can reevaluate both of these medications.         DOREEN OCHOA NP             D: 05/13/2019   T: 05/13/2019   MT:       Name:      ELENA VARGAS   MRN:      [id number]        Account:      [id number]   :      1932           Visit Date:   2019      Document: [id number]       cc: [facility]        Flo To MD

## 2019-05-24 NOTE — PROGRESS NOTES
Yumi Souza is a 86 year old female being seen today for comprehensive and regulatory visit at [facility].    Code Status: Full Code.   Health Care Power of : Extended Emergency Contact Information  Primary Emergency Contact: JULIO CESAR WOOTEN  Home Phone: [phone]  Mobile Phone: [phone]  Relation: Grandchild  Secondary Emergency Contact: PRIYA RAINES  Address: [address] [place] [address] [id number] [place] [address]  Home Phone: [phone]  Relation: Daughter     Allergies: Aspirin; Amoxicillin; Ciprofloxacin; Lisinopril; Sulfa drugs; and Toradol [ketorolac]     Chief Complaint / HPI: 60 day nursing home recert.  Was in the emergency department recently, notes reviewed.  Making threatening comments, but work up was found to be consistent with her advanced dementia.  Today she has no complaints, but is extremely unreliable due to disease.    Past Medical, Surgical, Family and Social History reviewed: YES.     Medications: reviewed  Medications - recent changes: no    Review of Systems:  Unreliable.  Toileting:    Continent of Bowel: Yes   Continent of Bladder: Yes  Mobility:      Recent Labs:   Results for orders placed or performed during the hospital encounter of 05/12/19   *UA reflex to Microscopic   Result Value Ref Range    Color Urine Yellow     Appearance Urine Clear     Glucose Urine Negative NEG^Negative mg/dL    Bilirubin Urine Negative NEG^Negative    Ketones Urine Negative NEG^Negative mg/dL    Specific Gravity Urine 1.010 1.000 - 1.030    Blood Urine Negative NEG^Negative    pH Urine 6.0 5.0 - 9.0 pH    Protein Albumin Urine Negative NEG^Negative mg/dL    Urobilinogen Urine 0.2 0.2 - 1.0 EU/dL    Nitrite Urine Negative NEG^Negative    Leukocyte Esterase Urine Negative NEG^Negative    Source Midstream Urine    CBC with platelets differential   Result Value Ref Range    WBC 9.7 4.0 - 11.0 10e9/L    RBC Count 4.78 3.8 - 5.2 10e12/L    Hemoglobin 15.5 11.7 - 15.7 g/dL    Hematocrit  45.9 35.0 - 47.0 %    MCV 96 78 - 100 fl    MCH 32.4 26.5 - 33.0 pg    MCHC 33.8 31.5 - 36.5 g/dL    RDW 13.2 10.0 - 15.0 %    Platelet Count 218 150 - 450 10e9/L    Diff Method Automated Method     % Neutrophils 55.4 %    % Lymphocytes 35.6 %    % Monocytes 6.0 %    % Eosinophils 2.2 %    % Basophils 0.4 %    % Immature Granulocytes 0.4 %    Absolute Neutrophil 5.4 1.6 - 8.3 10e9/L    Absolute Lymphocytes 3.5 0.8 - 5.3 10e9/L    Absolute Monocytes 0.6 0.0 - 1.3 10e9/L    Absolute Eosinophils 0.2 0.0 - 0.7 10e9/L    Absolute Basophils 0.0 0.0 - 0.2 10e9/L    Abs Immature Granulocytes 0.0 0 - 0.4 10e9/L   Comprehensive metabolic panel   Result Value Ref Range    Sodium 133 (L) 134 - 144 mmol/L    Potassium 3.6 3.5 - 5.1 mmol/L    Chloride 98 98 - 107 mmol/L    Carbon Dioxide 28 21 - 31 mmol/L    Anion Gap 7 3 - 14 mmol/L    Glucose 102 70 - 105 mg/dL    Urea Nitrogen 13 7 - 25 mg/dL    Creatinine 0.65 0.60 - 1.20 mg/dL    GFR Estimate 86 >60 mL/min/[1.73_m2]    GFR Estimate If Black >90 >60 mL/min/[1.73_m2]    Calcium 9.1 8.6 - 10.3 mg/dL    Bilirubin Total 0.3 0.3 - 1.0 mg/dL    Albumin 3.4 (L) 3.5 - 5.7 g/dL    Protein Total 7.2 6.4 - 8.9 g/dL    Alkaline Phosphatase 79 34 - 104 U/L    ALT 9 7 - 52 U/L    AST 13 13 - 39 U/L         Pertinent Screening Tool results: None    Current Therapies: none    Exam:  Vital signs reviewed. 187#,144/61, 74  GENERAL APPEARANCE: healthy, alert and no distress  RESP: lungs clear to auscultation - no rales, rhonchi or wheezes  CV: regular rate and rhythm, normal S1 S2, no S3 or S4, no murmur, click or rub, no peripheral edema and peripheral pulses strong  ABDOMEN: soft, nontender, no hepatosplenomegaly, no masses and bowel sounds normal  PSYCH: affect normal/bright, judgement and insight impaired but she remains conversant, tangential at times    Assessment and Plan:    (G30.1,  F02.80) Late onset Alzheimer's disease without behavioral disturbance  (primary encounter  diagnosis)  Comment: no changes to cares, this is end stage and in nursing home long term now.      Plan: follow up in 60 days.    Flo To MD on 5/24/2019 at 7:47 AM

## 2022-07-07 NOTE — TELEPHONE ENCOUNTER
Patient has ran out of tramadol and needs another Rx sent to [facility].  Galdino Rodriguez LPN ..............1/28/2019 2:40 PM    
Pt.'s daughter calling and requesting for medications to all be switched over to [facility].  
Rx sent. Will need to complete controlled substance agreement at next DM follow-up. DAYNA Corona CNP on 1/29/2019 at 8:55 AM    
Spoke with Ora and she states Dr. Camp in [place] had been prescribing the tramadol. She states it is prescribed as 50mg Q8H PRN. She states patient has a lot of pain and night, however does not use prescription often.     Ragini Marsh LPN...................1/29/2019  8:50 AM  
Tramadol is not on her medication list. Who has been prescribing this and for what type of pain? DAYNA Corona CNP on 1/29/2019 at 8:30 AM    
never used

## (undated) RX ORDER — FLUCONAZOLE 150 MG/1
TABLET ORAL
Status: DISPENSED
Start: 2019-01-01